# Patient Record
Sex: MALE | Race: BLACK OR AFRICAN AMERICAN | NOT HISPANIC OR LATINO | Employment: FULL TIME | URBAN - METROPOLITAN AREA
[De-identification: names, ages, dates, MRNs, and addresses within clinical notes are randomized per-mention and may not be internally consistent; named-entity substitution may affect disease eponyms.]

---

## 2024-01-17 ENCOUNTER — APPOINTMENT (EMERGENCY)
Dept: CT IMAGING | Facility: HOSPITAL | Age: 54
End: 2024-01-17
Payer: COMMERCIAL

## 2024-01-17 ENCOUNTER — APPOINTMENT (EMERGENCY)
Dept: RADIOLOGY | Facility: HOSPITAL | Age: 54
End: 2024-01-17
Payer: COMMERCIAL

## 2024-01-17 ENCOUNTER — APPOINTMENT (OUTPATIENT)
Dept: MRI IMAGING | Facility: HOSPITAL | Age: 54
End: 2024-01-17
Payer: COMMERCIAL

## 2024-01-17 ENCOUNTER — HOSPITAL ENCOUNTER (OUTPATIENT)
Facility: HOSPITAL | Age: 54
Setting detail: OBSERVATION
Discharge: HOME | End: 2024-01-18
Attending: EMERGENCY MEDICINE | Admitting: INTERNAL MEDICINE
Payer: COMMERCIAL

## 2024-01-17 ENCOUNTER — APPOINTMENT (OUTPATIENT)
Dept: NON INVASIVE DIAGNOSTICS | Facility: HOSPITAL | Age: 54
End: 2024-01-17
Payer: COMMERCIAL

## 2024-01-17 DIAGNOSIS — R29.90 STROKE-LIKE SYMPTOMS: ICD-10-CM

## 2024-01-17 DIAGNOSIS — R07.9 CHEST PAIN: ICD-10-CM

## 2024-01-17 DIAGNOSIS — R20.2 PARESTHESIAS: Primary | ICD-10-CM

## 2024-01-17 PROBLEM — I10 PRIMARY HYPERTENSION: Status: ACTIVE | Noted: 2024-01-17

## 2024-01-17 LAB
2HR DELTA HS TROPONIN: 2 NG/L
4HR DELTA HS TROPONIN: 1 NG/L
ALBUMIN SERPL BCP-MCNC: 4.3 G/DL (ref 3.5–5)
ALP SERPL-CCNC: 59 U/L (ref 34–104)
ALT SERPL W P-5'-P-CCNC: 39 U/L (ref 7–52)
ANION GAP SERPL CALCULATED.3IONS-SCNC: 9 MMOL/L
AORTIC ROOT: 2.7 CM
APICAL FOUR CHAMBER EJECTION FRACTION: 64 %
ASCENDING AORTA: 3 CM
AST SERPL W P-5'-P-CCNC: 28 U/L (ref 13–39)
ATRIAL RATE: 70 BPM
ATRIAL RATE: 86 BPM
BASOPHILS # BLD AUTO: 0.02 THOUSANDS/ÂΜL (ref 0–0.1)
BASOPHILS NFR BLD AUTO: 1 % (ref 0–1)
BILIRUB SERPL-MCNC: 0.45 MG/DL (ref 0.2–1)
BSA FOR ECHO PROCEDURE: 2.14 M2
BUN SERPL-MCNC: 10 MG/DL (ref 5–25)
CALCIUM SERPL-MCNC: 9.4 MG/DL (ref 8.4–10.2)
CARDIAC TROPONIN I PNL SERPL HS: 4 NG/L
CARDIAC TROPONIN I PNL SERPL HS: 5 NG/L
CARDIAC TROPONIN I PNL SERPL HS: 6 NG/L
CHLORIDE SERPL-SCNC: 106 MMOL/L (ref 96–108)
CHOLEST SERPL-MCNC: 134 MG/DL
CO2 SERPL-SCNC: 22 MMOL/L (ref 21–32)
CREAT SERPL-MCNC: 0.96 MG/DL (ref 0.6–1.3)
E WAVE DECELERATION TIME: 274 MS
E/A RATIO: 1.11
EOSINOPHIL # BLD AUTO: 0.15 THOUSAND/ÂΜL (ref 0–0.61)
EOSINOPHIL NFR BLD AUTO: 4 % (ref 0–6)
ERYTHROCYTE [DISTWIDTH] IN BLOOD BY AUTOMATED COUNT: 11.9 % (ref 11.6–15.1)
FRACTIONAL SHORTENING: 54 (ref 28–44)
GFR SERPL CREATININE-BSD FRML MDRD: 89 ML/MIN/1.73SQ M
GLUCOSE SERPL-MCNC: 93 MG/DL (ref 65–140)
GLUCOSE SERPL-MCNC: 94 MG/DL (ref 65–140)
HCT VFR BLD AUTO: 46.3 % (ref 36.5–49.3)
HDLC SERPL-MCNC: 39 MG/DL
HGB BLD-MCNC: 14.9 G/DL (ref 12–17)
IMM GRANULOCYTES # BLD AUTO: 0.02 THOUSAND/UL (ref 0–0.2)
IMM GRANULOCYTES NFR BLD AUTO: 1 % (ref 0–2)
INTERVENTRICULAR SEPTUM IN DIASTOLE (PARASTERNAL SHORT AXIS VIEW): 1 CM
INTERVENTRICULAR SEPTUM: 1 CM (ref 0.6–1.1)
LAAS-AP2: 14.9 CM2
LAAS-AP4: 17.5 CM2
LDLC SERPL CALC-MCNC: 81 MG/DL (ref 0–100)
LEFT ATRIUM AREA SYSTOLE SINGLE PLANE A4C: 15.9 CM2
LEFT ATRIUM SIZE: 3.5 CM
LEFT ATRIUM VOLUME (MOD BIPLANE): 46 ML
LEFT INTERNAL DIMENSION IN SYSTOLE: 2.4 CM (ref 2.1–4)
LEFT VENTRICULAR INTERNAL DIMENSION IN DIASTOLE: 5.2 CM (ref 3.5–6)
LEFT VENTRICULAR POSTERIOR WALL IN END DIASTOLE: 1 CM
LEFT VENTRICULAR STROKE VOLUME: 113 ML
LVSV (TEICH): 113 ML
LYMPHOCYTES # BLD AUTO: 1.56 THOUSANDS/ÂΜL (ref 0.6–4.47)
LYMPHOCYTES NFR BLD AUTO: 37 % (ref 14–44)
MCH RBC QN AUTO: 30.7 PG (ref 26.8–34.3)
MCHC RBC AUTO-ENTMCNC: 32.2 G/DL (ref 31.4–37.4)
MCV RBC AUTO: 95 FL (ref 82–98)
MONOCYTES # BLD AUTO: 0.74 THOUSAND/ÂΜL (ref 0.17–1.22)
MONOCYTES NFR BLD AUTO: 18 % (ref 4–12)
MV E'TISSUE VEL-SEP: 9 CM/S
MV PEAK A VEL: 0.56 M/S
MV PEAK E VEL: 62 CM/S
MV STENOSIS PRESSURE HALF TIME: 79 MS
MV VALVE AREA P 1/2 METHOD: 2.78
NEUTROPHILS # BLD AUTO: 1.73 THOUSANDS/ÂΜL (ref 1.85–7.62)
NEUTS SEG NFR BLD AUTO: 39 % (ref 43–75)
NRBC BLD AUTO-RTO: 0 /100 WBCS
P AXIS: 57 DEGREES
P AXIS: 62 DEGREES
PLATELET # BLD AUTO: 209 THOUSANDS/UL (ref 149–390)
PMV BLD AUTO: 10 FL (ref 8.9–12.7)
POTASSIUM SERPL-SCNC: 3.7 MMOL/L (ref 3.5–5.3)
PR INTERVAL: 162 MS
PR INTERVAL: 176 MS
PROT SERPL-MCNC: 8.7 G/DL (ref 6.4–8.4)
QRS AXIS: 11 DEGREES
QRS AXIS: 4 DEGREES
QRSD INTERVAL: 84 MS
QRSD INTERVAL: 86 MS
QT INTERVAL: 348 MS
QT INTERVAL: 360 MS
QTC INTERVAL: 388 MS
QTC INTERVAL: 416 MS
RBC # BLD AUTO: 4.86 MILLION/UL (ref 3.88–5.62)
RIGHT ATRIUM AREA SYSTOLE A4C: 11.4 CM2
RIGHT VENTRICLE ID DIMENSION: 3.2 CM
SL CV LEFT ATRIUM LENGTH A2C: 4.4 CM
SL CV LV EF: 60
SL CV PED ECHO LEFT VENTRICLE DIASTOLIC VOLUME (MOD BIPLANE) 2D: 132 ML
SL CV PED ECHO LEFT VENTRICLE SYSTOLIC VOLUME (MOD BIPLANE) 2D: 19 ML
SODIUM SERPL-SCNC: 137 MMOL/L (ref 135–147)
T WAVE AXIS: 28 DEGREES
T WAVE AXIS: 42 DEGREES
TRICUSPID ANNULAR PLANE SYSTOLIC EXCURSION: 2.6 CM
TRIGL SERPL-MCNC: 72 MG/DL
TSH SERPL DL<=0.05 MIU/L-ACNC: 2.71 UIU/ML (ref 0.45–4.5)
VENTRICULAR RATE: 70 BPM
VENTRICULAR RATE: 86 BPM
WBC # BLD AUTO: 4.22 THOUSAND/UL (ref 4.31–10.16)

## 2024-01-17 PROCEDURE — 84484 ASSAY OF TROPONIN QUANT: CPT

## 2024-01-17 PROCEDURE — 70496 CT ANGIOGRAPHY HEAD: CPT

## 2024-01-17 PROCEDURE — 99285 EMERGENCY DEPT VISIT HI MDM: CPT

## 2024-01-17 PROCEDURE — 71046 X-RAY EXAM CHEST 2 VIEWS: CPT

## 2024-01-17 PROCEDURE — 36415 COLL VENOUS BLD VENIPUNCTURE: CPT

## 2024-01-17 PROCEDURE — 99283 EMERGENCY DEPT VISIT LOW MDM: CPT | Performed by: STUDENT IN AN ORGANIZED HEALTH CARE EDUCATION/TRAINING PROGRAM

## 2024-01-17 PROCEDURE — 99222 1ST HOSP IP/OBS MODERATE 55: CPT | Performed by: STUDENT IN AN ORGANIZED HEALTH CARE EDUCATION/TRAINING PROGRAM

## 2024-01-17 PROCEDURE — 84443 ASSAY THYROID STIM HORMONE: CPT

## 2024-01-17 PROCEDURE — 93306 TTE W/DOPPLER COMPLETE: CPT

## 2024-01-17 PROCEDURE — 70498 CT ANGIOGRAPHY NECK: CPT

## 2024-01-17 PROCEDURE — 80061 LIPID PANEL: CPT

## 2024-01-17 PROCEDURE — 70551 MRI BRAIN STEM W/O DYE: CPT

## 2024-01-17 PROCEDURE — 93005 ELECTROCARDIOGRAM TRACING: CPT

## 2024-01-17 PROCEDURE — 82948 REAGENT STRIP/BLOOD GLUCOSE: CPT

## 2024-01-17 PROCEDURE — 93306 TTE W/DOPPLER COMPLETE: CPT | Performed by: INTERNAL MEDICINE

## 2024-01-17 PROCEDURE — 80053 COMPREHEN METABOLIC PANEL: CPT

## 2024-01-17 PROCEDURE — 85025 COMPLETE CBC W/AUTO DIFF WBC: CPT

## 2024-01-17 RX ORDER — DOCUSATE SODIUM 100 MG/1
100 CAPSULE, LIQUID FILLED ORAL 2 TIMES DAILY PRN
Status: DISCONTINUED | OUTPATIENT
Start: 2024-01-17 | End: 2024-01-18 | Stop reason: HOSPADM

## 2024-01-17 RX ORDER — CALCIUM CARBONATE 500 MG/1
1000 TABLET, CHEWABLE ORAL DAILY PRN
Status: DISCONTINUED | OUTPATIENT
Start: 2024-01-17 | End: 2024-01-18 | Stop reason: HOSPADM

## 2024-01-17 RX ORDER — ASPIRIN 81 MG/1
81 TABLET, CHEWABLE ORAL DAILY
Status: DISCONTINUED | OUTPATIENT
Start: 2024-01-17 | End: 2024-01-18

## 2024-01-17 RX ORDER — DOCUSATE SODIUM 100 MG/1
100 CAPSULE, LIQUID FILLED ORAL 2 TIMES DAILY
Status: DISCONTINUED | OUTPATIENT
Start: 2024-01-17 | End: 2024-01-17

## 2024-01-17 RX ORDER — SODIUM CHLORIDE 9 MG/ML
3 INJECTION INTRAVENOUS
Status: DISCONTINUED | OUTPATIENT
Start: 2024-01-17 | End: 2024-01-18 | Stop reason: HOSPADM

## 2024-01-17 RX ORDER — ACETAMINOPHEN 325 MG/1
650 TABLET ORAL EVERY 4 HOURS PRN
Status: DISCONTINUED | OUTPATIENT
Start: 2024-01-17 | End: 2024-01-18 | Stop reason: HOSPADM

## 2024-01-17 RX ORDER — ATORVASTATIN CALCIUM 20 MG/1
20 TABLET, FILM COATED ORAL EVERY EVENING
Status: DISCONTINUED | OUTPATIENT
Start: 2024-01-17 | End: 2024-01-18 | Stop reason: HOSPADM

## 2024-01-17 RX ORDER — ONDANSETRON 2 MG/ML
4 INJECTION INTRAMUSCULAR; INTRAVENOUS EVERY 6 HOURS PRN
Status: DISCONTINUED | OUTPATIENT
Start: 2024-01-17 | End: 2024-01-18 | Stop reason: HOSPADM

## 2024-01-17 RX ORDER — AMLODIPINE BESYLATE 10 MG/1
10 TABLET ORAL DAILY
COMMUNITY
Start: 2023-12-11

## 2024-01-17 RX ORDER — MAGNESIUM SULFATE HEPTAHYDRATE 40 MG/ML
2 INJECTION, SOLUTION INTRAVENOUS ONCE
Status: COMPLETED | OUTPATIENT
Start: 2024-01-17 | End: 2024-01-17

## 2024-01-17 RX ORDER — AMLODIPINE BESYLATE 10 MG/1
10 TABLET ORAL DAILY
Status: DISCONTINUED | OUTPATIENT
Start: 2024-01-17 | End: 2024-01-18 | Stop reason: HOSPADM

## 2024-01-17 RX ADMIN — ATORVASTATIN CALCIUM 20 MG: 20 TABLET, FILM COATED ORAL at 18:55

## 2024-01-17 RX ADMIN — ASPIRIN 81 MG: 81 TABLET, CHEWABLE ORAL at 09:18

## 2024-01-17 RX ADMIN — AMLODIPINE BESYLATE 10 MG: 10 TABLET ORAL at 09:18

## 2024-01-17 RX ADMIN — MAGNESIUM SULFATE HEPTAHYDRATE 2 G: 40 INJECTION, SOLUTION INTRAVENOUS at 10:42

## 2024-01-17 RX ADMIN — IOHEXOL 85 ML: 350 INJECTION, SOLUTION INTRAVENOUS at 03:29
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
## 2024-01-17 NOTE — PLAN OF CARE
Problem: PAIN - ADULT  Goal: Verbalizes/displays adequate comfort level or baseline comfort level  Description: Interventions:  - Encourage patient to monitor pain and request assistance  - Assess pain using appropriate pain scale  - Administer analgesics based on type and severity of pain and evaluate response  - Implement non-pharmacological measures as appropriate and evaluate response  - Consider cultural and social influences on pain and pain management  - Notify physician/advanced practitioner if interventions unsuccessful or patient reports new pain  Outcome: Progressing     Problem: INFECTION - ADULT  Goal: Absence or prevention of progression during hospitalization  Description: INTERVENTIONS:  - Assess and monitor for signs and symptoms of infection  - Monitor lab/diagnostic results  - Monitor all insertion sites, i.e. indwelling lines, tubes, and drains  - Monitor endotracheal if appropriate and nasal secretions for changes in amount and color  - [place] appropriate cooling/warming therapies per order  - Administer medications as ordered  - Instruct and encourage patient and family to use good hand hygiene technique  - Identify and instruct in appropriate isolation precautions for identified infection/condition  Outcome: Progressing     Problem: SAFETY ADULT  Goal: Patient will remain free of falls  Description: INTERVENTIONS:  - Educate patient/family on patient safety including physical limitations  - Instruct patient to call for assistance with activity   - Consult OT/PT to assist with strengthening/mobility   - Keep Call bell within reach  - Keep bed low and locked with side rails adjusted as appropriate  - Keep care items and personal belongings within reach  - Initiate and maintain comfort rounds  - Make Fall Risk Sign visible to staff  - Offer Toileting every 2 Hours, in advance of need  - Initiate/Maintain bed alarm  - Obtain necessary fall risk management equipment  - Apply yellow socks and  bracelet for high fall risk patients  - Consider moving patient to room near nurses station  Outcome: Progressing  Goal: Maintain or return to baseline ADL function  Description: INTERVENTIONS:  -  Assess patient's ability to carry out ADLs; assess patient's baseline for ADL function and identify physical deficits which impact ability to perform ADLs (bathing, care of mouth/teeth, toileting, grooming, dressing, etc.)  - Assess/evaluate cause of self-care deficits   - Assess range of motion  - Assess patient's mobility; develop plan if impaired  - Assess patient's need for assistive devices and provide as appropriate  - Encourage maximum independence but intervene and supervise when necessary  - Involve family in performance of ADLs  - Assess for home care needs following discharge   - Consider OT consult to assist with ADL evaluation and planning for discharge  - Provide patient education as appropriate  Outcome: Progressing  Goal: Maintains/Returns to pre admission functional level  Description: INTERVENTIONS:  - Perform AM-PAC 6 Click Basic Mobility/ Daily Activity assessment daily.  - Set and communicate daily mobility goal to care team and patient/family/caregiver.   - Collaborate with rehabilitation services on mobility goals if consulted  - Perform Range of Motion 3 times a day.  - Reposition patient every 2 hours.  - Dangle patient 3 times a day  - Stand patient 3 times a day  - Ambulate patient 3 times a day  - Out of bed to chair 3 times a day   - Out of bed for meals 3 times a day  - Out of bed for toileting  - Record patient progress and toleration of activity level   Outcome: Progressing     Problem: DISCHARGE PLANNING  Goal: Discharge to home or other facility with appropriate resources  Description: INTERVENTIONS:  - Identify barriers to discharge w/patient and caregiver  - Arrange for needed discharge resources and transportation as appropriate  - Identify discharge learning needs (meds, wound care,  etc.)  - Arrange for interpretive services to assist at discharge as needed  - Refer to Case Management Department for coordinating discharge planning if the patient needs post-hospital services based on physician/advanced practitioner order or complex needs related to functional status, cognitive ability, or social support system  Outcome: Progressing     Problem: Knowledge Deficit  Goal: Patient/family/caregiver demonstrates understanding of disease process, treatment plan, medications, and discharge instructions  Description: Complete learning assessment and assess knowledge base.  Interventions:  - Provide teaching at level of understanding  - Provide teaching via preferred learning methods  Outcome: Progressing     Problem: NEUROSENSORY - ADULT  Goal: Achieves stable or improved neurological status  Description: INTERVENTIONS  - Monitor and report changes in neurological status  - Monitor vital signs such as temperature, blood pressure, glucose, and any other labs ordered   - Initiate measures to prevent increased intracranial pressure  - Monitor for seizure activity and implement precautions if appropriate      Outcome: Progressing  Goal: Remains free of injury related to seizures activity  Description: INTERVENTIONS  - Maintain airway, patient safety  and administer oxygen as ordered  - Monitor patient for seizure activity, document and report duration and description of seizure to physician/advanced practitioner  - If seizure occurs,  ensure patient safety during seizure  - Reorient patient post seizure  - Seizure pads on all 4 side rails  - Instruct patient/family to notify RN of any seizure activity including if an aura is experienced  - Instruct patient/family to call for assistance with activity based on nursing assessment  - Administer anti-seizure medications if ordered    Outcome: Progressing  Goal: Achieves maximal functionality and self care  Description: INTERVENTIONS  - Monitor swallowing and  airway patency with patient fatigue and changes in neurological status  - Encourage and assist patient to increase activity and self care.   - Encourage visually impaired, hearing impaired and aphasic patients to use assistive/communication devices  Outcome: Progressing     Problem: Neurological Deficit  Goal: Neurological status is stable or improving  Description: Interventions:  - Monitor and assess patient's level of consciousness, motor function, sensory function, and level of assistance needed for ADLs.   - Monitor and report changes from baseline. Collaborate with interdisciplinary team to initiate plan and implement interventions as ordered.   - Provide and maintain a safe environment.  - Consider seizure precautions.  - Consider fall precautions.  - Consider aspiration precautions.  - Consider bleeding precautions.  Outcome: Progressing     Problem: Activity Intolerance/Impaired Mobility  Goal: Mobility/activity is maintained at optimum level for patient  Description: Interventions:  - Assess and monitor patient  barriers to mobility and need for assistive/adaptive devices.  - Assess patient's emotional response to limitations.  - Collaborate with interdisciplinary team and initiate plans and interventions as ordered.  - Encourage independent activity per ability.  - Maintain proper body alignment.  - Perform active/passive rom as tolerated/ordered.  - Plan activities to conserve energy.  - Turn patient as appropriate  Outcome: Progressing     Problem: Communication Impairment  Goal: Ability to express needs and understand communication  Description: Assess patient's communication skills and ability to understand information.  Patient will demonstrate use of effective communication techniques, alternative methods of communication and understanding even if not able to speak.     - Encourage communication and provide alternate methods of communication as needed.  - Collaborate with case management/social  services for discharge needs.  - Include patient/family/caregiver in decisions related to communication.  Outcome: Progressing     Problem: Potential for Aspiration  Goal: Non-ventilated patient's risk of aspiration is minimized  Description: Assess and monitor vital signs, respiratory status, and labs (WBC).  Monitor for signs of aspiration (tachypnea, cough, rales, wheezing, cyanosis, fever).    - Assess and monitor patient's ability to swallow.  - Place patient up in chair to eat if possible.  - HOB up at 90 degrees to eat if unable to get patient up into chair.  - Supervise patient during oral intake.   - Instruct patient/ family to take small bites.  - Instruct patient/ family to take small single sips when taking liquids.  - Follow patient-specific strategies generated by speech pathologist.  Outcome: Progressing  Goal: Ventilated patient's risk of aspiration is minimized  Description: Assess and monitor vital signs, respiratory status, airway cuff pressure, and labs (WBC).  Monitor for signs of aspiration (tachypnea, cough, rales, wheezing, cyanosis, fever).    - Elevate head of bed 30 degrees if patient has tube feeding.  - Monitor tube feeding.  Outcome: Progressing     Problem: Nutrition  Goal: Nutrition/Hydration status is improving  Description: Monitor and assess patient's nutrition/hydration status for malnutrition (ex- brittle hair, bruises, dry skin, pale skin and conjunctiva, muscle wasting, smooth red tongue, and disorientation). Collaborate with interdisciplinary team and initiate plan and interventions as ordered.  Monitor patient's weight and dietary intake as ordered or per policy. Utilize nutrition screening tool and intervene per policy. Determine patient's food preferences and provide high-protein, high-caloric foods as appropriate.     - Assist patient with eating.  - Allow adequate time for meals.  - Encourage patient to take dietary supplement as ordered.  - Collaborate with clinical  nutritionist.  - Include patient/family/caregiver in decisions related to nutrition.  Outcome: Progressing

## 2024-01-17 NOTE — ASSESSMENT & PLAN NOTE
52 y/o male with HTN, who presented with complaints of R UE/LE heaviness since 1/12 with symptoms progressively worsening since onset along with chest pain, R paresthesias, and R eye twitching. BP on presentation 146/92.    Work up:  - CTA head and neck:  No evidence of acute intracranial hemorrhage. No definite CT evidence of acute intracranial abnormality. Clinical correlation is recommended. If further evaluation is indicated, MRI with diffusion-weighted imaging could be performed, if there are no contraindications.  No evidence of large vessel occlusion.  There is moderate stenosis of the proximal left cavernous ICA.  Aberrant right subclavian artery.  - MRI brain:  1. No acute infarction, intracranial hemorrhage or mass effect.  2. Partially empty sella turcica and ectasia of the intraorbital optic nerves, nonspecific findings which can be associated with intracranial hypertension. Further clinical assessment advised.  - Labs: LDL 81, TSH 2.710    Work up unremarkable for acute stroke. Per discussion with attending neurologist- DDX may include: cervical (lower suspicion) vs migraine vs functional.    Plan:  - Discontinue stroke pathway  Echo pending official read  Hemoglobin A1c pending  Goal normotension; avoid hypotension  Continue telemetry  PT/OT/ST  Frequent neuro checks. Continue to monitor and notify neurology with any changes.   - Give magnesium sulfate 2 g IV x 1  - Medical management and supportive care per primary team. Correction of any metabolic or infectious disturbances.

## 2024-01-17 NOTE — ED PROVIDER NOTES
"History  Chief Complaint   Patient presents with    Numbness     Patient reports right sided facial numbness and \"heaviness\" going down their right side, arm and leg. Patient reports \"my left side feels so light and this right side feels so heavy\". Patient reports symptoms started 4 days ago, denies dizziness or pain, reports intermittent right eye \"pulsing\" but denies vision issues.      The patient is a 53 y.o. male with a history of HTN who presents to [facility] with a chief complaint of right sided heaviness of the right arm and right leg. Symptoms began Friday 1/12/24 and have been worsening since onset. His pain is currently rated as a 3/10 in severity and described as chest pain without radiation. Associated symptoms include chest pain, paresthesias. Symptoms are aggravated with none noted and alleviating factors include none noted. The patient denies fever, chills, night sweats, cough, wheezing, nausea, vomiting, cardiac history, leg pain or swelling, confusion, slurred speech, facial asymmetry. No other reported symptoms at this time.  Patient denies allergies to anything  Patient reports this has never happened before          History provided by:  Patient   used: No        Prior to Admission Medications   Prescriptions Last Dose Informant Patient Reported? Taking?   amLODIPine (NORVASC) 10 mg tablet   Yes Yes   Sig: Take 10 mg by mouth daily      Facility-Administered Medications: None       No past medical history on file.    No past surgical history on file.    No family history on file.  I have reviewed and agree with the history as documented.    E-Cigarette/Vaping     E-Cigarette/Vaping Substances     Social History     Tobacco Use    Smoking status: Never    Smokeless tobacco: Never   Substance Use Topics    Alcohol use: Never    Drug use: Never       Review of Systems   Constitutional:  Negative for chills and fever.   HENT:  Negative for ear pain and sore " throat.    Eyes:  Negative for pain and visual disturbance.   Respiratory:  Positive for chest tightness. Negative for cough, choking, shortness of breath and wheezing.    Cardiovascular:  Positive for chest pain. Negative for palpitations and leg swelling.   Gastrointestinal:  Negative for abdominal distention, abdominal pain, constipation, diarrhea, nausea, rectal pain and vomiting.   Genitourinary:  Negative for dysuria and hematuria.   Musculoskeletal:  Negative for arthralgias and back pain.   Skin:  Negative for color change and rash.   Neurological:  Negative for dizziness, seizures, syncope, facial asymmetry, speech difficulty, light-headedness and headaches.        Paresthesias   All other systems reviewed and are negative.      Physical Exam  Physical Exam  Vitals reviewed.   Constitutional:       General: He is not in acute distress.     Appearance: Normal appearance. He is not ill-appearing.   HENT:      Head: Normocephalic and atraumatic.      Right Ear: Tympanic membrane normal.      Left Ear: Tympanic membrane normal.      Nose: Nose normal.      Mouth/Throat:      Mouth: Mucous membranes are moist.   Eyes:      Extraocular Movements: Extraocular movements intact.      Conjunctiva/sclera: Conjunctivae normal.      Pupils: Pupils are equal, round, and reactive to light.   Cardiovascular:      Rate and Rhythm: Normal rate.      Pulses: Normal pulses.   Pulmonary:      Effort: Pulmonary effort is normal. No respiratory distress.      Breath sounds: Normal breath sounds. No stridor. No wheezing or rhonchi.   Abdominal:      General: Abdomen is flat.   Musculoskeletal:         General: Normal range of motion.      Cervical back: Normal range of motion. No rigidity or tenderness.   Skin:     General: Skin is warm and dry.      Capillary Refill: Capillary refill takes less than 2 seconds.      Coloration: Skin is not jaundiced or pale.      Findings: No bruising.   Neurological:      General: No focal  deficit present.      Mental Status: He is alert and oriented to person, place, and time.      GCS: GCS eye subscore is 4. GCS verbal subscore is 5. GCS motor subscore is 6.      Cranial Nerves: Cranial nerves 2-12 are intact. No cranial nerve deficit.      Sensory: Sensation is intact. No sensory deficit.      Motor: Motor function is intact. No weakness, tremor, abnormal muscle tone, seizure activity or pronator drift.      Coordination: Coordination is intact.      Gait: Gait is intact. Gait normal.         Vital Signs  ED Triage Vitals   Temperature Pulse Respirations Blood Pressure SpO2   01/17/24 0200 01/17/24 0200 01/17/24 0200 01/17/24 0200 01/17/24 0200   97.7 °F (36.5 °C) 86 19 146/92 98 %      Temp Source Heart Rate Source Patient Position - Orthostatic VS BP Location FiO2 (%)   01/17/24 0200 01/17/24 0200 01/17/24 0300 01/17/24 0300 --   Temporal Monitor Lying Right arm       Pain Score       --                  Vitals:    01/17/24 0530 01/17/24 0600 01/17/24 0630 01/17/24 0715   BP: 115/78 132/83 119/82 139/94   Pulse: 72 71 74 75   Patient Position - Orthostatic VS: Lying Lying Lying Lying         Visual Acuity  Visual Acuity      Flowsheet Row Most Recent Value   L Pupil Size (mm) 3   R Pupil Size (mm) 3            ED Medications  Medications   sodium chloride (PF) 0.9 % injection 3 mL (has no administration in time range)   amLODIPine (NORVASC) tablet 10 mg (has no administration in time range)   acetaminophen (TYLENOL) tablet 650 mg (has no administration in time range)   ondansetron (ZOFRAN) injection 4 mg (has no administration in time range)   calcium carbonate (TUMS) chewable tablet 1,000 mg (has no administration in time range)   atorvastatin (LIPITOR) tablet 20 mg (has no administration in time range)   aspirin chewable tablet 81 mg (has no administration in time range)   docusate sodium (COLACE) capsule 100 mg (has no administration in time range)   iohexol (OMNIPAQUE) 350 MG/ML injection  (MULTI-DOSE) 85 mL (85 mL Intravenous Given 1/17/24 0329)       Diagnostic Studies  Results Reviewed       Procedure Component Value Units Date/Time    HS Troponin I 4hr [[id number]] Collected: 01/17/24 0751    Lab Status: In process Specimen: Blood from Arm, Right Updated: 01/17/24 0755    Lipid Panel with Direct LDL reflex [[id number]] Collected: 01/17/24 0751    Lab Status: In process Specimen: Blood from Arm, Right Updated: 01/17/24 0755    TSH, 3rd generation with Free T4 reflex [[id number]]  (Normal) Collected: 01/17/24 0247    Lab Status: Final result Specimen: Blood from Arm, Right Updated: 01/17/24 0726     TSH 3RD GENERATON 2.710 uIU/mL     HS Troponin I 2hr [[id number]]  (Normal) Collected: 01/17/24 0456    Lab Status: Final result Specimen: Blood from Arm, Right Updated: 01/17/24 0532     hs TnI 2hr 6 ng/L      Delta 2hr hsTnI 2 ng/L     HS Troponin 0hr (reflex protocol) [[id number]]  (Normal) Collected: 01/17/24 0247    Lab Status: Final result Specimen: Blood from Arm, Right Updated: 01/17/24 0316     hs TnI 0hr 4 ng/L     Comprehensive metabolic panel [[id number]]  (Abnormal) Collected: 01/17/24 0247    Lab Status: Final result Specimen: Blood from Arm, Right Updated: 01/17/24 0311     Sodium 137 mmol/L      Potassium 3.7 mmol/L      Chloride 106 mmol/L      CO2 22 mmol/L      ANION GAP 9 mmol/L      BUN 10 mg/dL      Creatinine 0.96 mg/dL      Glucose 94 mg/dL      Calcium 9.4 mg/dL      AST 28 U/L      ALT 39 U/L      Alkaline Phosphatase 59 U/L      Total Protein 8.7 g/dL      Albumin 4.3 g/dL      Total Bilirubin 0.45 mg/dL      eGFR 89 ml/min/1.73sq m     Narrative:      [employer] guidelines for Chronic Kidney Disease (CKD):     Stage 1 with normal or high GFR (GFR > 90 mL/min/1.73 square meters)    Stage 2 Mild CKD (GFR = 60-89 mL/min/1.73 square meters)    Stage 3A Moderate CKD (GFR = 45-59 mL/min/1.73 square meters)    Stage 3B Moderate CKD (GFR = 30-44 mL/min/1.73 square  meters)    Stage 4 Severe CKD (GFR = 15-29 mL/min/1.73 square meters)    Stage 5 End Stage CKD (GFR <15 mL/min/1.73 square meters)  Note: GFR calculation is accurate only with a steady state creatinine    CBC and differential [[id number]]  (Abnormal) Collected: 01/17/24 0247    Lab Status: Final result Specimen: Blood from Arm, Right Updated: 01/17/24 0253     WBC 4.22 Thousand/uL      RBC 4.86 Million/uL      Hemoglobin 14.9 g/dL      Hematocrit 46.3 %      MCV 95 fL      MCH 30.7 pg      MCHC 32.2 g/dL      RDW 11.9 %      MPV 10.0 fL      Platelets 209 Thousands/uL      nRBC 0 /100 WBCs      Neutrophils Relative 39 %      Immat GRANS % 1 %      Lymphocytes Relative 37 %      Monocytes Relative 18 %      Eosinophils Relative 4 %      Basophils Relative 1 %      Neutrophils Absolute 1.73 Thousands/µL      Immature Grans Absolute 0.02 Thousand/uL      Lymphocytes Absolute 1.56 Thousands/µL      Monocytes Absolute 0.74 Thousand/µL      Eosinophils Absolute 0.15 Thousand/µL      Basophils Absolute 0.02 Thousands/µL     Fingerstick Glucose (POCT) [[id number]]  (Normal) Collected: 01/17/24 0208    Lab Status: Final result Updated: 01/17/24 0208     POC Glucose 93 mg/dl                    MRI brain wo contrast   Final Result by Feliberto Elizabeth MD (01/17 6851)         1. No acute infarction, intracranial hemorrhage or mass effect.   2. Partially empty sella turcica and ectasia of the intraorbital optic nerves, nonspecific findings which can be associated with intracranial hypertension. Further clinical assessment advised. Does the patient have papilledema?      Workstation performed: [id number]         CTA head and neck with and without contrast   Final Result by Guerline Goldstein MD (01/17 5627)      No evidence of acute intracranial hemorrhage. No definite CT evidence of acute intracranial abnormality. Clinical correlation is recommended. If further evaluation is indicated, MRI with diffusion-weighted imaging  could be performed, if there are no    contraindications.      No evidence of large vessel occlusion.      There is moderate stenosis of the proximal left cavernous ICA.      Aberrant right subclavian artery.      Small thyroid nodules. Please see discussion/guidelines.            Workstation performed: [id number]         X-ray chest 2 views    (Results Pending)              Procedures  Procedures         ED Course  ED Course as of 01/17/24 0807   Wed Jan 17, 2024   0317 hs TnI 0hr: 4   0519 CTA head and neck with and without contrast     No evidence of acute intracranial hemorrhage. No definite CT evidence of acute intracranial abnormality. Clinical correlation is recommended. If further evaluation is indicated, MRI with diffusion-weighted imaging could be performed, if there are no   contraindications.     No evidence of large vessel occlusion.     There is moderate stenosis of the proximal left cavernous ICA.     Aberrant right subclavian artery.     Small thyroid nodules   0536 Delta 2hr hsTnI: 2                   Medical Decision Making  The patient is a 53 y.o. male with a history of HTN who presents to [facility] with a chief complaint of right sided heaviness of the right arm and right leg.  Patient is having symptoms of paresthesia to the right side of the body.  As well as some chest pain.  Will obtain a cardiac workup as well as a CTA.  Patient's symptoms are started on Friday 1/12/24 he is out of the window for tPA as well as code stroke.  Patient has no focal deficits on exam.  Patient's blood work unremarkable.  CTA showed No evidence of acute intracranial hemorrhage. No definite CT evidence of acute intracranial abnormality. Clinical correlation is recommended. If further evaluation is indicated, MRI with diffusion-weighted imaging could be performed, if there are no contraindications. No evidence of large vessel occlusion. There is moderate stenosis of the proximal left cavernous ICA.  Aberrant right subclavian artery. Small thyroid nodules.  Discussed results with the patient.  Patient understands.  Given persistent symptoms I recommend he stays in the hospital to be evaluated by neurology and for an MRI.  Patient understands agrees with treatment plan.  Discussed with qiana will admit obs under Dr. Moreira with neuroconsult for continued evaluation and treatment.     Problems Addressed:  Chest pain: acute illness or injury  Paresthesias: acute illness or injury    Amount and/or Complexity of Data Reviewed  Labs: ordered. Decision-making details documented in ED Course.  Radiology: ordered. Decision-making details documented in ED Course.    Risk  Prescription drug management.  Decision regarding hospitalization.             Disposition  Final diagnoses:   Paresthesias   Chest pain     Time reflects when diagnosis was documented in both MDM as applicable and the Disposition within this note       Time User Action Codes Description Comment    1/17/2024  5:52 AM Brian Segura Add [R20.2] Paresthesias     1/17/2024  5:52 AM Brian Segura [R07.9] Chest pain           ED Disposition       ED Disposition   Admit    Condition   Stable    Date/Time   Wed Jan 17, 2024  5:52 AM    Comment   Case was discussed with QIANA and the patient's admission status was agreed to be Admission Status: observation status to the service of Dr. Moreira .               Follow-up Information    None         Patient's Medications   Discharge Prescriptions    No medications on file       No discharge procedures on file.    PDMP Review       None            ED Provider  Electronically Signed by             Brian Segura PA-C  01/17/24 0814

## 2024-01-17 NOTE — CONSULTS
Consultation - Neurology   Bhavin Veloz 53 y.o. male MRN: [id number]  Unit/Bed#: ED 13 Encounter: [id number]      Assessment/Plan     * Stroke-like symptoms  Assessment & Plan  54 y/o male with HTN, who presented with complaints of R UE/LE heaviness since 1/12 with symptoms progressively worsening since onset along with chest pain, R paresthesias, and R eye twitching. BP on presentation 146/92.    Work up:  - CTA head and neck:  No evidence of acute intracranial hemorrhage. No definite CT evidence of acute intracranial abnormality. Clinical correlation is recommended. If further evaluation is indicated, MRI with diffusion-weighted imaging could be performed, if there are no contraindications.  No evidence of large vessel occlusion.  There is moderate stenosis of the proximal left cavernous ICA.  Aberrant right subclavian artery.  - MRI brain:  1. No acute infarction, intracranial hemorrhage or mass effect.  2. Partially empty sella turcica and ectasia of the intraorbital optic nerves, nonspecific findings which can be associated with intracranial hypertension. Further clinical assessment advised.  - Labs: LDL 81, TSH 2.710    Work up unremarkable for acute stroke. Per discussion with attending neurologist- DDX may include: cervical (lower suspicion) vs migraine vs functional.    Plan:  - Discontinue stroke pathway  Echo pending official read  Hemoglobin A1c pending  Goal normotension; avoid hypotension  Continue telemetry  PT/OT/ST  Frequent neuro checks. Continue to monitor and notify neurology with any changes.   - Give magnesium sulfate 2 g IV x 1  - Medical management and supportive care per primary team. Correction of any metabolic or infectious disturbances.         Bhavin Veloz will need follow up in in 6 weeks with general attending or advance practitioner. He will not require outpatient neurological testing.    Case and treatment plan reviewed with attending neurologist, Dr. Berkowitz. Please see  "attending attestation for any further recommendations.      History of Present Illness     Reason for Consult / Principal Problem: R sided numbness/heaviness  HPI: Bhavin Veloz is a 53 y.o.  male with HTN, who presents with R sided numbness/heaviness.    Patient presented with complaints of R UE/LE heaviness since 1/12 with symptoms progressively worsening since onset. Patient also reported chest pain, R sided paresthesias, and R eye twitching. BP on presentation 146/92. Patient did report on admission that he had similar symptoms approximately 2-3 years ago and was found to be in hypertensive urgency at that time.    Patient seen and evaluated with attending neurologist. Patient reports he got into an argument with his wife on Friday. He then proceeded to have onset of R UE/LE heaviness. He thought he was tired and needed rest. He got rest Saturday and didn't do anything. He reports symptoms were worse Saturday because he also had \"eye jumping\", described as feeling a pulsating sensation in his R eye. He denies any eye pain or vision changes. He went to work Sunday and Monday and still felt the same, although the eye jumping went away Monday. He stayed home Tuesday and got some rest. He reported that once he calmed himself down and he got rest, his symptoms improved. He never had L sided symptoms. He did note that on Sunday, he felt the R corner of his mouth was down more than L. He reports his co-workers made a joke that he cut his mustache shorter on the R side. This resolved on Monday. He denies any speech changes, trouble swallowing, headache, neck pain, or back pain. He did have some chest pressure that was intermittent. He feels like he is carrying extra weight on the R side so he was to walk slower. He reports he had similar symptoms a few years ago when he became aggravated and his SBP was in the 180s. His symptoms lasted several days before resolving on its own at that time. He has not had a recurrence " until now. He denies any past medical history besides HTN. He reports he used to have migraines that felt like a ball thrown at his head located in the bifrontal region with photosensitivity and wavy vision but no sound sensitivity, nausea, or vomiting. It usually lasted 10-20 minutes if he went to a dark room and calmed himself down. He also noted the migraines improved when he got glasses. Currently, he still feels R side heaviness.    Inpatient consult to Neurology  Consult performed by: DA Dominguez  Consult ordered by: DA Betts          Review of Systems  A 12 system ROS was completed. Other than the above mentioned complaints in the HPI and those commented on below, all remaining systems were negative.    Historical Information   No past medical history on file.  No past surgical history on file.  Social History   Social History     Substance and Sexual Activity   Alcohol Use Never     Social History     Substance and Sexual Activity   Drug Use Never     E-Cigarette/Vaping     E-Cigarette/Vaping Substances     Social History     Tobacco Use   Smoking Status Never   Smokeless Tobacco Never     Family History: No family history on file.    Review of previous medical records was completed.    Meds/Allergies   all current active meds have been reviewed, current meds:   Current Facility-Administered Medications   Medication Dose Route Frequency    acetaminophen (TYLENOL) tablet 650 mg  650 mg Oral Q4H PRN    amLODIPine (NORVASC) tablet 10 mg  10 mg Oral Daily    aspirin chewable tablet 81 mg  81 mg Oral Daily    atorvastatin (LIPITOR) tablet 20 mg  20 mg Oral QPM    calcium carbonate (TUMS) chewable tablet 1,000 mg  1,000 mg Oral Daily PRN    docusate sodium (COLACE) capsule 100 mg  100 mg Oral BID PRN    ondansetron (ZOFRAN) injection 4 mg  4 mg Intravenous Q6H PRN    sodium chloride (PF) 0.9 % injection 3 mL  3 mL Intravenous Q1H PRN   , and PTA meds:   Prior to Admission Medications  "  Prescriptions Last Dose Informant Patient Reported? Taking?   amLODIPine (NORVASC) 10 mg tablet   Yes Yes   Sig: Take 10 mg by mouth daily      Facility-Administered Medications: None       No Known Allergies    Objective   Vitals:Blood pressure 119/82, pulse 74, temperature 97.7 °F (36.5 °C), temperature source Temporal, resp. rate 17, height 5' 10\" (1.778 m), weight 96.2 kg (212 lb), SpO2 96%.,Body mass index is 30.42 kg/m².    Intake/Output Summary (Last 24 hours) at 1/17/2024 1302  Last data filed at 1/17/2024 1247  Gross per 24 hour   Intake 50 ml   Output --   Net 50 ml       Invasive Devices:   Invasive Devices       Peripheral Intravenous Line  Duration             Peripheral IV 01/17/24 Proximal;Right;Ventral (anterior) Forearm <1 day                    Physical and neurologic exam performed by attending neurologist:  Physical Exam  Vitals and nursing note reviewed.   Constitutional:       General: He is not in acute distress.     Appearance: Normal appearance. He is not ill-appearing.   HENT:      Head: Normocephalic.      Mouth/Throat:      Mouth: Mucous membranes are moist.      Pharynx: Oropharynx is clear.   Eyes:      General: No scleral icterus.        Right eye: No discharge.         Left eye: No discharge.      Extraocular Movements: EOM normal.      Conjunctiva/sclera: Conjunctivae normal.   Cardiovascular:      Rate and Rhythm: Normal rate.   Pulmonary:      Effort: Pulmonary effort is normal. No respiratory distress.   Musculoskeletal:         General: Normal range of motion.   Skin:     General: Skin is warm and dry.      Coloration: Skin is not jaundiced or pale.   Neurological:      Mental Status: He is alert and oriented to person, place, and time.      Coordination: Finger-Nose-Finger Test and Heel to Shin Test normal.      Deep Tendon Reflexes:      Reflex Scores:       Tricep reflexes are 2+ on the right side and 2+ on the left side.       Bicep reflexes are 2+ on the right side and 2+ " on the left side.       Brachioradialis reflexes are 2+ on the right side and 2+ on the left side.       Patellar reflexes are 3+ (Cross adductor present) on the right side and 3+ (Cross adductor present) on the left side.       Achilles reflexes are 2+ on the right side and 2+ on the left side.  Psychiatric:         Mood and Affect: Mood normal.       Neurologic Exam     Mental Status   Oriented to person, place, and time.   Level of consciousness: alert  Able to follow simple, crossover, and multi-step commands appropriately. Able to name objects and repeat/read phrases correctly. No dysarthria noted.     Cranial Nerves     CN II   Right visual field deficit: none  Left visual field deficit: none     CN III, IV, VI   Extraocular motions are normal.     CN VII   Facial expression full, symmetric.     CN IX, X   Palate: symmetric    CN XI   CN XI normal.     CN XII   CN XII normal.   Symmetric sensation to light touch in face  Decreased sensation to temperature in R cheek and chin  Decreased sensation to pinprick in L forehead, R cheek, R chin     Motor Exam   Muscle bulk: normal  Right arm pronator drift: absent  Left arm pronator drift: absent  Bilateral UE strength 5/5 deltoids, biceps, triceps, hand   R wrist flexion, wrist extension, finger flexion, finger extension 5/5  Bilateral LE strength 5/5 hip flexion, knee flexion, knee extension, dorsiflexion, plantar flexion     Sensory Exam   RUE/LE sensation to light touch feels like extra weight on top of it  Decreased sensation to pinprick/vibration in RUE/LE  No extinction noted     Gait, Coordination, and Reflexes     Coordination   Finger to nose coordination: normal  Heel to shin coordination: normal    Tremor   Resting tremor: absent  Intention tremor: absent    Reflexes   Right brachioradialis: 2+  Left brachioradialis: 2+  Right biceps: 2+  Left biceps: 2+  Right triceps: 2+  Left triceps: 2+  Right patellar: 3+ (Cross adductor present)  Left patellar:  "3+ (Cross adductor present)  Right achilles: 2+  Left achilles: 2+  Right plantar: normal  Left plantar: normal  Right Levy: present  Left Levy: present  Right ankle clonus: absent  Left ankle clonus: absent      Lab Results: I have personally reviewed pertinent reports.  , CBC:   Results from last 7 days   Lab Units 01/17/24  0247   WBC Thousand/uL 4.22*   RBC Million/uL 4.86   HEMOGLOBIN g/dL 14.9   HEMATOCRIT % 46.3   MCV fL 95   PLATELETS Thousands/uL 209   , BMP/CMP:   Results from last 7 days   Lab Units 01/17/24  0247   SODIUM mmol/L 137   POTASSIUM mmol/L 3.7   CHLORIDE mmol/L 106   CO2 mmol/L 22   BUN mg/dL 10   CREATININE mg/dL 0.96   CALCIUM mg/dL 9.4   AST U/L 28   ALT U/L 39   ALK PHOS U/L 59   EGFR ml/min/1.73sq m 89   , Vitamin B12:   , HgBA1C:   , TSH:   Results from last 7 days   Lab Units 01/17/24  0247   TSH 3RD GENERATON uIU/mL 2.710   , Coagulation:   , Lipid Profile:   Results from last 7 days   Lab Units 01/17/24  0751   HDL mg/dL 39*   LDL CALC mg/dL 81   TRIGLYCERIDES mg/dL 72   , Ammonia:   , Urinalysis:       Invalid input(s): \"URIBILINOGEN\", Drug Screen:   , Medication Drug Levels:       Invalid input(s): \"CARBAMAZEPINE\", \"LACOSAMIDE\", \"OXCARBAZEPINE\"    Imaging Studies: I have personally reviewed pertinent reports.   and I have personally reviewed pertinent films in PACS  EKG, Pathology, and Other Studies: I have personally reviewed pertinent reports.    VTE Prophylaxis: Sequential compression device (Venodyne)     Code Status: Level 1 - Full Code  Advance Directive and Living Will:      Power of :    POLST:    "

## 2024-01-17 NOTE — H&P
[facility]  H&P  Name: Bhavin Veloz 53 y.o. male I MRN: [id number]  Unit/Bed#: ED 13 I Date of Admission: 1/17/2024   Date of Service: 1/17/2024 I Hospital Day: 0      Assessment/Plan   * Stroke-like symptoms  Assessment & Plan  Background  Patient reports after an argument with spouse on Friday, he experienced right sided numbness, tingling in upper and lower extremities with right sided heaviness on his chest. He felt his symptoms would be resolve with rest but they persisted. Around 1am today (1/17/24), the sensation of heaviness, and tingling became worse. He states he had a similar episode about 2-3 years ago and was found to be in an hypertensive emergency. Denies facial droop, SOB, fever, headache    CT scan: No evidence of acute intracranial hemorrhage. No definite CT evidence of acute intracranial abnormality. Clinical correlation is recommended. If further evaluation is indicated, MRI with diffusion-weighted imaging could be performed, if there are no contraindications. No evidence of large vessel occlusion.There is moderate stenosis of the proximal left cavernous ICA. Aberrant right subclavian artery.Small thyroid nodules. Please see discussion/guidelines.  Stroke pathway  MRI pending  Echo pending  Monitor on tele  Low risk for DVT  Start daily ASA 81mg  Start statin  Lipid panel pending  Neurology consult; recommendations appreciated    Primary hypertension  Assessment & Plan  Bp acceptable on admission  Continue home dose amlodipine  Monitor            VTE Pharmacologic Prophylaxis: VTE Score: 2 Moderate Risk (Score 3-4) - Pharmacological DVT Prophylaxis Ordered: enoxaparin (Lovenox).  Code Status: Level 1 - Full Code   Discussion with family: Updated  (daughter) at bedside.    Anticipated Length of Stay: Patient will be admitted on an observation basis with an anticipated length of stay of less than 2 midnights secondary to rule out stroke.    Total Time  "Spent on Date of Encounter in care of patient: 75 mins. This time was spent on one or more of the following: performing physical exam; counseling and coordination of care; obtaining or reviewing history; documenting in the medical record; reviewing/ordering tests, medications or procedures; communicating with other healthcare professionals and discussing with patient's family/caregivers.    Chief Complaint:    Chief Complaint   Patient presents with    Numbness     Patient reports right sided facial numbness and \"heaviness\" going down their right side, arm and leg. Patient reports \"my left side feels so light and this right side feels so heavy\". Patient reports symptoms started 4 days ago, denies dizziness or pain, reports intermittent right eye \"pulsing\" but denies vision issues.          History of Present Illness:  Bhavin Veloz is a 53 y.o. male with a PMH of hypertension who presents with right sided weakness. Per patient on Friday after an argument with spouse, he experienced right sided weakness on his upper and lower extremity with heaviness on the right side of his chest. He thought the symptoms would resolve with rest, but they persisted. At 1am this morning his symptoms escalated with an addition of eye twitching. He denies facial droop, dizziness, headache, slurred speech or any other related symptoms. On arrival to ED, CT reveals no acute intracranial hemorrhage. MRI pending. Will admit for Neuro evaluation.      Review of Systems:  Review of Systems   Constitutional: Negative.    HENT: Negative.     Respiratory:  Positive for chest tightness. Negative for shortness of breath.    Cardiovascular:  Negative for chest pain.   Gastrointestinal: Negative.    Musculoskeletal: Negative.    Neurological:  Positive for weakness and numbness.   Psychiatric/Behavioral: Negative.         Past Medical and Surgical History:   No past medical history on file.    No past surgical history on " file.    Meds/Allergies:  Prior to Admission medications    Medication Sig Start Date End Date Taking? Authorizing Provider   amLODIPine (NORVASC) 10 mg tablet Take 10 mg by mouth daily 12/11/23  Yes Historical Provider, MD     I have reviewed home medications with patient personally.    Allergies: No Known Allergies    Social History:  Marital Status: /Civil Union   Occupation:   Patient Pre-hospital Living Situation: Home, With spouse  Patient Pre-hospital Level of Mobility: walks  Patient Pre-hospital Diet Restrictions:   Substance Use History:   Social History     Substance and Sexual Activity   Alcohol Use Never     Social History     Tobacco Use   Smoking Status Never   Smokeless Tobacco Never     Social History     Substance and Sexual Activity   Drug Use Never       Family History:  No family history on file.    Physical Exam:     Vitals:   Blood Pressure: 119/82 (01/17/24 0630)  Pulse: 74 (01/17/24 0630)  Temperature: 97.7 °F (36.5 °C) (01/17/24 0200)  Temp Source: Temporal (01/17/24 0200)  Respirations: 19 (01/17/24 0630)  Weight - Scale: 96.6 kg (212 lb 15.4 oz) (01/17/24 0200)  SpO2: 98 % (01/17/24 0630)    Physical Exam  Constitutional:       Appearance: He is obese.   HENT:      Head: Normocephalic.      Mouth/Throat:      Mouth: Mucous membranes are moist.   Cardiovascular:      Rate and Rhythm: Normal rate and regular rhythm.      Pulses: Normal pulses.      Heart sounds: Normal heart sounds.   Pulmonary:      Effort: Pulmonary effort is normal. No respiratory distress.      Breath sounds: Normal breath sounds.   Abdominal:      Palpations: Abdomen is soft.   Musculoskeletal:         General: Normal range of motion.   Skin:     General: Skin is warm.      Capillary Refill: Capillary refill takes less than 2 seconds.   Neurological:      General: No focal deficit present.      Mental Status: He is alert.      Motor: Weakness present.      Comments: Right sided weakness, decreased  strength    Psychiatric:         Mood and Affect: Mood normal.         Behavior: Behavior normal.         Thought Content: Thought content normal.          Additional Data:     Lab Results:  Results from last 7 days   Lab Units 01/17/24  0247   WBC Thousand/uL 4.22*   HEMOGLOBIN g/dL 14.9   HEMATOCRIT % 46.3   PLATELETS Thousands/uL 209   NEUTROS PCT % 39*   LYMPHS PCT % 37   MONOS PCT % 18*   EOS PCT % 4     Results from last 7 days   Lab Units 01/17/24  0247   SODIUM mmol/L 137   POTASSIUM mmol/L 3.7   CHLORIDE mmol/L 106   CO2 mmol/L 22   BUN mg/dL 10   CREATININE mg/dL 0.96   ANION GAP mmol/L 9   CALCIUM mg/dL 9.4   ALBUMIN g/dL 4.3   TOTAL BILIRUBIN mg/dL 0.45   ALK PHOS U/L 59   ALT U/L 39   AST U/L 28   GLUCOSE RANDOM mg/dL 94         Results from last 7 days   Lab Units 01/17/24  0208   POC GLUCOSE mg/dl 93               Lines/Drains:  Invasive Devices       Peripheral Intravenous Line  Duration             Peripheral IV 01/17/24 Proximal;Right;Ventral (anterior) Forearm <1 day                        Imaging: Reviewed radiology reports from this admission including: CT head  CTA head and neck with and without contrast   Final Result by Guerline Goldstein MD (01/17 0507)      No evidence of acute intracranial hemorrhage. No definite CT evidence of acute intracranial abnormality. Clinical correlation is recommended. If further evaluation is indicated, MRI with diffusion-weighted imaging could be performed, if there are no    contraindications.      No evidence of large vessel occlusion.      There is moderate stenosis of the proximal left cavernous ICA.      Aberrant right subclavian artery.      Small thyroid nodules. Please see discussion/guidelines.            Workstation performed: [id number]         X-ray chest 2 views    (Results Pending)   MRI brain wo contrast    (Results Pending)       EKG and Other Studies Reviewed on Admission:   EKG: NSR. HR 70.    ** Please Note: This note has been constructed using a  voice recognition system. **

## 2024-01-18 VITALS
TEMPERATURE: 99 F | RESPIRATION RATE: 18 BRPM | BODY MASS INDEX: 30.35 KG/M2 | DIASTOLIC BLOOD PRESSURE: 81 MMHG | OXYGEN SATURATION: 96 % | WEIGHT: 212 LBS | HEART RATE: 88 BPM | SYSTOLIC BLOOD PRESSURE: 123 MMHG | HEIGHT: 70 IN

## 2024-01-18 LAB
ANION GAP SERPL CALCULATED.3IONS-SCNC: 7 MMOL/L
BUN SERPL-MCNC: 11 MG/DL (ref 5–25)
CALCIUM SERPL-MCNC: 9.2 MG/DL (ref 8.4–10.2)
CHLORIDE SERPL-SCNC: 106 MMOL/L (ref 96–108)
CHOLEST SERPL-MCNC: 135 MG/DL
CO2 SERPL-SCNC: 25 MMOL/L (ref 21–32)
CREAT SERPL-MCNC: 0.95 MG/DL (ref 0.6–1.3)
EST. AVERAGE GLUCOSE BLD GHB EST-MCNC: 126 MG/DL
GFR SERPL CREATININE-BSD FRML MDRD: 91 ML/MIN/1.73SQ M
GLUCOSE P FAST SERPL-MCNC: 92 MG/DL (ref 65–99)
GLUCOSE SERPL-MCNC: 92 MG/DL (ref 65–140)
HBA1C MFR BLD: 6 %
HDLC SERPL-MCNC: 39 MG/DL
LDLC SERPL CALC-MCNC: 78 MG/DL (ref 0–100)
POTASSIUM SERPL-SCNC: 3.6 MMOL/L (ref 3.5–5.3)
SODIUM SERPL-SCNC: 138 MMOL/L (ref 135–147)
TRIGL SERPL-MCNC: 91 MG/DL

## 2024-01-18 PROCEDURE — 97161 PT EVAL LOW COMPLEX 20 MIN: CPT

## 2024-01-18 PROCEDURE — 97165 OT EVAL LOW COMPLEX 30 MIN: CPT

## 2024-01-18 PROCEDURE — 80061 LIPID PANEL: CPT

## 2024-01-18 PROCEDURE — 83036 HEMOGLOBIN GLYCOSYLATED A1C: CPT

## 2024-01-18 PROCEDURE — 99239 HOSP IP/OBS DSCHRG MGMT >30: CPT | Performed by: STUDENT IN AN ORGANIZED HEALTH CARE EDUCATION/TRAINING PROGRAM

## 2024-01-18 PROCEDURE — 80048 BASIC METABOLIC PNL TOTAL CA: CPT | Performed by: STUDENT IN AN ORGANIZED HEALTH CARE EDUCATION/TRAINING PROGRAM

## 2024-01-18 RX ADMIN — AMLODIPINE BESYLATE 10 MG: 10 TABLET ORAL at 10:16

## 2024-01-18 NOTE — PROGRESS NOTES
formerly [facility]  Progress Note  Name: Bhavin Veloz I  MRN: [id number]  Unit/Bed#: [facility] I Date of Admission: 1/17/2024   Date of Service: 1/18/2024  Hospital Day: 0    Assessment/Plan   Primary hypertension  Assessment & Plan  Bp acceptable on admission  Continue home dose amlodipine  Monitor     * Stroke-like symptoms  Assessment & Plan  Background  Patient reports after an argument with spouse on Friday, he experienced right sided numbness, tingling in upper and lower extremities with right sided heaviness on his chest. He felt his symptoms would be resolve with rest but they persisted. Around 1am today (1/17/24), the sensation of heaviness, and tingling became worse. He states he had a similar episode about 2-3 years ago and was found to be in an hypertensive emergency. Denies facial droop, SOB, fever, headache    CT scan: No evidence of acute intracranial hemorrhage. No definite CT evidence of acute intracranial abnormality. Clinical correlation is recommended. If further evaluation is indicated, MRI with diffusion-weighted imaging could be performed, if there are no contraindications. No evidence of large vessel occlusion.There is moderate stenosis of the proximal left cavernous ICA. Aberrant right subclavian artery.Small thyroid nodules. Please see discussion/guidelines.  MRI showed no evidence of stroke-neurology thinks is likely due to complex migraine.  Discontinue stroke pathway, aspirin, statin.  Patient given magnesium IV which seemed to help resolve symptoms  Feeling back to baseline prior to discharge  PT/OT recommended no further needs on discharge         Medical Problems       Resolved Problems  Date Reviewed: 1/17/2024   None       Discharging Physician / Practitioner: Devon Blackmon MD  PCP: Marcellus Hernandez  Admission Date:   Admission Orders (From admission, onward)       Ordered        01/17/24 0553  Place in Observation  Once                          Discharge  Date: 01/18/24    Consultations During Hospital Stay:  Neurology  PT/OT    Procedures Performed:   None    Significant Findings / Test Results:   MRI Brain: 1. No acute infarction, intracranial hemorrhage or mass effect.   2. Partially empty sella turcica and ectasia of the intraorbital optic nerves, nonspecific findings which can be associated with intracranial hypertension. Further clinical assessment advised. Does the patient have papilledema?   CT head/neck: No evidence of acute intracranial hemorrhage. No definite CT evidence of acute intracranial abnormality. Clinical correlation is recommended. If further evaluation is indicated, MRI with diffusion-weighted imaging could be performed, if there are no   contraindications.     No evidence of large vessel occlusion.     There is moderate stenosis of the proximal left cavernous ICA.     Aberrant right subclavian artery.     Small thyroid nodules. Please see discussion/guidelines.     Incidental Findings:   None     Test Results Pending at Discharge (will require follow up):   None     Outpatient Tests Requested:  None    Complications:  None    Reason for Admission: Strokelike symptoms    Hospital Course:   Bhavin Veloz is a 53 y.o. male patient who originally presented to the hospital on 1/17/2024 due to strokelike symptoms after an emotional argument.)  Present for several days prior to presentation.  He was admitted under the stroke pathway initially.  CT did not show any evidence of acute occlusion.  MRI confirmed that there is no evidence of ischemia.  MRI was read as anatomic abnormality which can sometimes be associated with intracranial hypertension.  Neurology did not have any concerns for this.  They felt the patient was likely symptomatic from a complex migraine.  He was given IV magnesium with resolution of his symptoms.  He will follow-up outpatient with neurology    Please see above list of diagnoses and related plan for additional information.  "    Condition at Discharge: good    Discharge Day Visit / Exam:   Subjective: Patient reports he feels completely back to baseline today.  Heaviness in his right arm and leg are resolved.  Vitals: Blood Pressure: 123/81 (01/18/24 1016)  Pulse: 88 (01/18/24 0718)  Temperature: 99 °F (37.2 °C) (01/18/24 0718)  Temp Source: Oral (01/18/24 0718)  Respirations: 18 (01/18/24 0718)  Height: 5' 10\" (177.8 cm) (01/17/24 1212)  Weight - Scale: 96.2 kg (212 lb) (01/17/24 1212)  SpO2: 96 % (01/18/24 0718)  Exam:   Physical Exam  Vitals and nursing note reviewed.   Constitutional:       General: He is not in acute distress.     Appearance: He is well-developed.   HENT:      Head: Normocephalic and atraumatic.   Eyes:      Conjunctiva/sclera: Conjunctivae normal.      Pupils: Pupils are equal, round, and reactive to light.   Cardiovascular:      Rate and Rhythm: Normal rate and regular rhythm.      Heart sounds: No murmur heard.  Pulmonary:      Effort: Pulmonary effort is normal. No respiratory distress.      Breath sounds: Normal breath sounds. No wheezing or rales.   Abdominal:      General: Abdomen is flat. Bowel sounds are normal. There is no distension.      Palpations: Abdomen is soft.      Tenderness: There is no abdominal tenderness. There is no guarding or rebound.   Musculoskeletal:         General: No swelling. Normal range of motion.      Cervical back: Normal range of motion.   Skin:     General: Skin is warm and dry.      Capillary Refill: Capillary refill takes less than 2 seconds.   Neurological:      General: No focal deficit present.      Mental Status: He is alert. Mental status is at baseline.   Psychiatric:         Mood and Affect: Mood normal.          Discussion with Family: Updated  (wife) at bedside.    Discharge instructions/Information to patient and family:   See after visit summary for information provided to patient and family.      Provisions for Follow-Up Care:  See after visit " summary for information related to follow-up care and any pertinent home health orders.      Mobility at time of Discharge:   Basic Mobility Inpatient Raw Score: 24  JH-HLM Goal: 8: Walk 250 feet or more  JH-HLM Achieved: 7: Walk 25 feet or more  HLM Goal NOT achieved. Continue to encourage mobility in post discharge setting.     Disposition:   Home    Planned Readmission: None     Discharge Statement:  I spent 40 minutes discharging the patient. This time was spent on the day of discharge. I had direct contact with the patient on the day of discharge. Greater than 50% of the total time was spent examining patient, answering all patient questions, arranging and discussing plan of care with patient as well as directly providing post-discharge instructions.  Additional time then spent on discharge activities.    Discharge Medications:  See after visit summary for reconciled discharge medications provided to patient and/or family.      **Please Note: This note may have been constructed using a voice recognition system**

## 2024-01-18 NOTE — DISCHARGE INSTR - AVS FIRST PAGE
Follow up with neurology in 6 weeks outpatient. Return to the ER if you have any new or worsening neurologic symptoms.

## 2024-01-18 NOTE — PLAN OF CARE
Problem: PAIN - ADULT  Goal: Verbalizes/displays adequate comfort level or baseline comfort level  Description: Interventions:  - Encourage patient to monitor pain and request assistance  - Assess pain using appropriate pain scale  - Administer analgesics based on type and severity of pain and evaluate response  - Implement non-pharmacological measures as appropriate and evaluate response  - Consider cultural and social influences on pain and pain management  - Notify physician/advanced practitioner if interventions unsuccessful or patient reports new pain  Outcome: Progressing     Problem: INFECTION - ADULT  Goal: Absence or prevention of progression during hospitalization  Description: INTERVENTIONS:  - Assess and monitor for signs and symptoms of infection  - Monitor lab/diagnostic results  - Monitor all insertion sites, i.e. indwelling lines, tubes, and drains  - Monitor endotracheal if appropriate and nasal secretions for changes in amount and color  - [place] appropriate cooling/warming therapies per order  - Administer medications as ordered  - Instruct and encourage patient and family to use good hand hygiene technique  - Identify and instruct in appropriate isolation precautions for identified infection/condition  Outcome: Progressing     Problem: SAFETY ADULT  Goal: Patient will remain free of falls  Description: INTERVENTIONS:  - Educate patient/family on patient safety including physical limitations  - Instruct patient to call for assistance with activity   - Consult OT/PT to assist with strengthening/mobility   - Keep Call bell within reach  - Keep bed low and locked with side rails adjusted as appropriate  - Keep care items and personal belongings within reach  - Initiate and maintain comfort rounds  - Make Fall Risk Sign visible to staff  - Offer Toileting every  Hours, in advance of need  - Initiate/Maintain alarm  - Obtain necessary fall risk management equipment:   - Apply yellow socks and  bracelet for high fall risk patients  - Consider moving patient to room near nurses station  Outcome: Progressing  Goal: Maintain or return to baseline ADL function  Description: INTERVENTIONS:  -  Assess patient's ability to carry out ADLs; assess patient's baseline for ADL function and identify physical deficits which impact ability to perform ADLs (bathing, care of mouth/teeth, toileting, grooming, dressing, etc.)  - Assess/evaluate cause of self-care deficits   - Assess range of motion  - Assess patient's mobility; develop plan if impaired  - Assess patient's need for assistive devices and provide as appropriate  - Encourage maximum independence but intervene and supervise when necessary  - Involve family in performance of ADLs  - Assess for home care needs following discharge   - Consider OT consult to assist with ADL evaluation and planning for discharge  - Provide patient education as appropriate  Outcome: Progressing  Goal: Maintains/Returns to pre admission functional level  Description: INTERVENTIONS:  - Perform AM-PAC 6 Click Basic Mobility/ Daily Activity assessment daily.  - Set and communicate daily mobility goal to care team and patient/family/caregiver.   - Collaborate with rehabilitation services on mobility goals if consulted  - Perform Range of Motion times a day.  - Reposition patient every  hours.  - Dangle patient  times a day  - Stand patient  times a day  - Ambulate patient times a day  - Out of bed to chair  times a day   - Out of bed for adriana times a day  - Out of bed for toileting  - Record patient progress and toleration of activity level   Outcome: Progressing     Problem: DISCHARGE PLANNING  Goal: Discharge to home or other facility with appropriate resources  Description: INTERVENTIONS:  - Identify barriers to discharge w/patient and caregiver  - Arrange for needed discharge resources and transportation as appropriate  - Identify discharge learning needs (meds, wound care, etc.)  -  Arrange for interpretive services to assist at discharge as needed  - Refer to Case Management Department for coordinating discharge planning if the patient needs post-hospital services based on physician/advanced practitioner order or complex needs related to functional status, cognitive ability, or social support system  Outcome: Progressing     Problem: Knowledge Deficit  Goal: Patient/family/caregiver demonstrates understanding of disease process, treatment plan, medications, and discharge instructions  Description: Complete learning assessment and assess knowledge base.  Interventions:  - Provide teaching at level of understanding  - Provide teaching via preferred learning methods  Outcome: Progressing     Problem: NEUROSENSORY - ADULT  Goal: Achieves stable or improved neurological status  Description: INTERVENTIONS  - Monitor and report changes in neurological status  - Monitor vital signs such as temperature, blood pressure, glucose, and any other labs ordered   - Initiate measures to prevent increased intracranial pressure  - Monitor for seizure activity and implement precautions if appropriate      Outcome: Progressing  Goal: Remains free of injury related to seizures activity  Description: INTERVENTIONS  - Maintain airway, patient safety  and administer oxygen as ordered  - Monitor patient for seizure activity, document and report duration and description of seizure to physician/advanced practitioner  - If seizure occurs,  ensure patient safety during seizure  - Reorient patient post seizure  - Seizure pads on all 4 side rails  - Instruct patient/family to notify RN of any seizure activity including if an aura is experienced  - Instruct patient/family to call for assistance with activity based on nursing assessment  - Administer anti-seizure medications if ordered    Outcome: Progressing  Goal: Achieves maximal functionality and self care  Description: INTERVENTIONS  - Monitor swallowing and airway  patency with patient fatigue and changes in neurological status  - Encourage and assist patient to increase activity and self care.   - Encourage visually impaired, hearing impaired and aphasic patients to use assistive/communication devices  Outcome: Progressing     Problem: Neurological Deficit  Goal: Neurological status is stable or improving  Description: Interventions:  - Monitor and assess patient's level of consciousness, motor function, sensory function, and level of assistance needed for ADLs.   - Monitor and report changes from baseline. Collaborate with interdisciplinary team to initiate plan and implement interventions as ordered.   - Provide and maintain a safe environment.  - Consider seizure precautions.  - Consider fall precautions.  - Consider aspiration precautions.  - Consider bleeding precautions.  Outcome: Progressing     Problem: Activity Intolerance/Impaired Mobility  Goal: Mobility/activity is maintained at optimum level for patient  Description: Interventions:  - Assess and monitor patient  barriers to mobility and need for assistive/adaptive devices.  - Assess patient's emotional response to limitations.  - Collaborate with interdisciplinary team and initiate plans and interventions as ordered.  - Encourage independent activity per ability.  - Maintain proper body alignment.  - Perform active/passive rom as tolerated/ordered.  - Plan activities to conserve energy.  - Turn patient as appropriate  Outcome: Progressing     Problem: Communication Impairment  Goal: Ability to express needs and understand communication  Description: Assess patient's communication skills and ability to understand information.  Patient will demonstrate use of effective communication techniques, alternative methods of communication and understanding even if not able to speak.     - Encourage communication and provide alternate methods of communication as needed.  - Collaborate with case management/  for discharge needs.  - Include patient/family/caregiver in decisions related to communication.  Outcome: Progressing     Problem: Potential for Aspiration  Goal: Non-ventilated patient's risk of aspiration is minimized  Description: Assess and monitor vital signs, respiratory status, and labs (WBC).  Monitor for signs of aspiration (tachypnea, cough, rales, wheezing, cyanosis, fever).    - Assess and monitor patient's ability to swallow.  - Place patient up in chair to eat if possible.  - HOB up at 90 degrees to eat if unable to get patient up into chair.  - Supervise patient during oral intake.   - Instruct patient/ family to take small bites.  - Instruct patient/ family to take small single sips when taking liquids.  - Follow patient-specific strategies generated by speech pathologist.  Outcome: Progressing  Goal: Ventilated patient's risk of aspiration is minimized  Description: Assess and monitor vital signs, respiratory status, airway cuff pressure, and labs (WBC).  Monitor for signs of aspiration (tachypnea, cough, rales, wheezing, cyanosis, fever).    - Elevate head of bed 30 degrees if patient has tube feeding.  - Monitor tube feeding.  Outcome: Progressing     Problem: Nutrition  Goal: Nutrition/Hydration status is improving  Description: Monitor and assess patient's nutrition/hydration status for malnutrition (ex- brittle hair, bruises, dry skin, pale skin and conjunctiva, muscle wasting, smooth red tongue, and disorientation). Collaborate with interdisciplinary team and initiate plan and interventions as ordered.  Monitor patient's weight and dietary intake as ordered or per policy. Utilize nutrition screening tool and intervene per policy. Determine patient's food preferences and provide high-protein, high-caloric foods as appropriate.     - Assist patient with eating.  - Allow adequate time for meals.  - Encourage patient to take dietary supplement as ordered.  - Collaborate with clinical  nutritionist.  - Include patient/family/caregiver in decisions related to nutrition.  Outcome: Progressing

## 2024-01-18 NOTE — DISCHARGE SUMMARY
[facility]  Discharge- Bhavin Veloz 1970, 53 y.o. male MRN: [id number]  Unit/Bed#: [facility] Encounter: [id number]  Primary Care Provider: Marcellus Hernandez   Date and time admitted to hospital: 1/17/2024  1:56 AM    Primary hypertension  Assessment & Plan  Bp acceptable on admission  Continue home dose amlodipine  Monitor     * Stroke-like symptoms  Assessment & Plan  Background  Patient reports after an argument with spouse on Friday, he experienced right sided numbness, tingling in upper and lower extremities with right sided heaviness on his chest. He felt his symptoms would be resolve with rest but they persisted. Around 1am today (1/17/24), the sensation of heaviness, and tingling became worse. He states he had a similar episode about 2-3 years ago and was found to be in an hypertensive emergency. Denies facial droop, SOB, fever, headache    CT scan: No evidence of acute intracranial hemorrhage. No definite CT evidence of acute intracranial abnormality. Clinical correlation is recommended. If further evaluation is indicated, MRI with diffusion-weighted imaging could be performed, if there are no contraindications. No evidence of large vessel occlusion.There is moderate stenosis of the proximal left cavernous ICA. Aberrant right subclavian artery.Small thyroid nodules. Please see discussion/guidelines.  MRI showed no evidence of stroke-neurology thinks is likely due to complex migraine.  Discontinue stroke pathway, aspirin, statin.  Patient given magnesium IV which seemed to help resolve symptoms  Feeling back to baseline prior to discharge  PT/OT recommended no further needs on discharge      Medical Problems       Resolved Problems  Date Reviewed: 1/17/2024   None       Discharging Physician / Practitioner: Devon Blackmon MD  PCP: Marcellus Hernandez  Admission Date:   Admission Orders (From admission, onward)       Ordered        01/17/24 0553  Place in Observation  Once                           Discharge Date: 01/18/24    Consultations During Hospital Stay:  Neurology  PT/OT    Procedures Performed:   None    Significant Findings / Test Results:   MRI Brain: 1. No acute infarction, intracranial hemorrhage or mass effect.   2. Partially empty sella turcica and ectasia of the intraorbital optic nerves, nonspecific findings which can be associated with intracranial hypertension. Further clinical assessment advised. Does the patient have papilledema?   CT head/neck: No evidence of acute intracranial hemorrhage. No definite CT evidence of acute intracranial abnormality. Clinical correlation is recommended. If further evaluation is indicated, MRI with diffusion-weighted imaging could be performed, if there are no   contraindications.     No evidence of large vessel occlusion.     There is moderate stenosis of the proximal left cavernous ICA.     Aberrant right subclavian artery.     Small thyroid nodules. Please see discussion/guidelines.     Incidental Findings:   None     Test Results Pending at Discharge (will require follow up):   None     Outpatient Tests Requested:  None    Complications:  None    Reason for Admission: Strokelike symptoms    Hospital Course:   Bhavin Veloz is a 53 y.o. male patient who originally presented to the hospital on 1/17/2024 due to strokelike symptoms after an emotional argument.)  Present for several days prior to presentation.  He was admitted under the stroke pathway initially.  CT did not show any evidence of acute occlusion.  MRI confirmed that there is no evidence of ischemia.  MRI was read as anatomic abnormality which can sometimes be associated with intracranial hypertension.  Neurology did not have any concerns for this.  They felt the patient was likely symptomatic from a complex migraine.  He was given IV magnesium with resolution of his symptoms.  He will follow-up outpatient with neurology     Please see above list of diagnoses and related plan  "for additional information.     Condition at Discharge: good    Discharge Day Visit / Exam:   Subjective:  Patient reports he feels completely back to baseline today.  Heaviness in his right arm and leg are resolved.   Vitals: Blood Pressure: 123/81 (01/18/24 1016)  Pulse: 88 (01/18/24 0718)  Temperature: 99 °F (37.2 °C) (01/18/24 0718)  Temp Source: Oral (01/18/24 0718)  Respirations: 18 (01/18/24 0718)  Height: 5' 10\" (177.8 cm) (01/17/24 1212)  Weight - Scale: 96.2 kg (212 lb) (01/17/24 1212)  SpO2: 96 % (01/18/24 0718)  Exam:   Physical Exam  Vitals and nursing note reviewed.   Constitutional:       General: He is not in acute distress.     Appearance: He is well-developed.   HENT:      Head: Normocephalic and atraumatic.   Eyes:      Conjunctiva/sclera: Conjunctivae normal.      Pupils: Pupils are equal, round, and reactive to light.   Cardiovascular:      Rate and Rhythm: Normal rate and regular rhythm.      Heart sounds: No murmur heard.  Pulmonary:      Effort: Pulmonary effort is normal. No respiratory distress.      Breath sounds: Normal breath sounds. No wheezing or rales.   Abdominal:      General: Abdomen is flat. Bowel sounds are normal. There is no distension.      Palpations: Abdomen is soft.      Tenderness: There is no abdominal tenderness. There is no guarding or rebound.   Musculoskeletal:         General: No swelling. Normal range of motion.      Cervical back: Normal range of motion.   Skin:     General: Skin is warm and dry.      Capillary Refill: Capillary refill takes less than 2 seconds.   Neurological:      General: No focal deficit present.      Mental Status: He is alert. Mental status is at baseline.   Psychiatric:         Mood and Affect: Mood normal.          Discussion with Family: Updated  (wife) at bedside.    Discharge instructions/Information to patient and family:   See after visit summary for information provided to patient and family.      Provisions for " Follow-Up Care:  See after visit summary for information related to follow-up care and any pertinent home health orders.      Mobility at time of Discharge:   Basic Mobility Inpatient Raw Score: 24  JH-HLM Goal: 8: Walk 250 feet or more  JH-HLM Achieved: 7: Walk 25 feet or more  HLM Goal NOT achieved. Continue to encourage mobility in post discharge setting.     Disposition:   Home    Planned Readmission: None     Discharge Statement:  I spent 40 minutes discharging the patient. This time was spent on the day of discharge. I had direct contact with the patient on the day of discharge. Greater than 50% of the total time was spent examining patient, answering all patient questions, arranging and discussing plan of care with patient as well as directly providing post-discharge instructions.  Additional time then spent on discharge activities.    Discharge Medications:  See after visit summary for reconciled discharge medications provided to patient and/or family.      **Please Note: This note may have been constructed using a voice recognition system**

## 2024-01-18 NOTE — PHYSICAL THERAPY NOTE
"   Physical Therapy Evaluation     Patient's Name: Bhavin Veloz    Admitting Diagnosis  Numbness [R20.0]  Chest pain [R07.9]  Paresthesias [R20.2]    Problem List  Patient Active Problem List   Diagnosis    Primary hypertension    Stroke-like symptoms     Past Medical History  No past medical history on file.    Past Surgical History  No past surgical history on file.     01/18/24 0936   PT Last Visit   PT Visit Date 01/18/24   Note Type   Note type Evaluation   Pain Assessment   Pain Assessment Tool 0-10   Pain Score No Pain   Restrictions/Precautions   Weight Bearing Precautions Per Order No   Other Precautions Telemetry;Fall Risk   Home Living   Type of Home House   Home Layout Two level;Bed/bath upstairs;Stairs to enter with rails  (2 AMALIA; flight of stairs to 2nd floor)   Bathroom Shower/Tub Walk-in shower   Bathroom Toilet Raised   Bathroom Equipment Built-in shower seat   Bathroom Accessibility Accessible   Home Equipment Other (Comment)  (none per patient)   Additional Comments Pt ambulates without an AD.   Prior Function   Level of [place] Independent with functional mobility;Independent with ADLs;Independent with IADLS   Lives With Family   Receives Help From Family   IADLs Independent with driving;Independent with medication management;Independent with meal prep   Falls in the last 6 months 0   Vocational Full time employment   General   Family/Caregiver Present No   Cognition   Overall Cognitive Status WFL   Arousal/Participation Alert   Orientation Level Oriented X4   Memory Within functional limits   Following Commands Follows all commands and directions without difficulty   Comments Pt agreeable to PT.   Subjective   Subjective \"I haven't been out of bed.\"   RLE Assessment   RLE Assessment WFL   LLE Assessment   LLE Assessment WFL   Light Touch   RLE Light Touch Grossly intact   LLE Light Touch Grossly intact   Bed Mobility   Supine to Sit 6  Modified independent   Additional items HOB " elevated   Transfers   Sit to Stand 7  Independent   Stand to Sit 7  Independent   Ambulation/Elevation   Gait pattern WNL   Gait Assistance 7  Independent   Assistive Device None   Distance 330 feet   Stair Management Assistance 6  Modified independent   Stair Management Technique One rail R;Alternating pattern;Foreward   Number of Stairs 12   Balance   Static Sitting Normal   Dynamic Sitting Good   Static Standing Good   Dynamic Standing Good   Ambulatory Good   Endurance Deficit   Endurance Deficit No   Activity Tolerance   Activity Tolerance Patient tolerated treatment well   Medical Staff Made Aware Dr. Blackmon; care coordination with OT   Nurse Made Aware DARNELL Kelly   Assessment   Prognosis Excellent   Problem List   (no acute PT impairments)   Assessment Pt is 53 year old male seen for PT evaluation s/p admit to [facility] on 1/17/2024 with Stroke-like symptoms. PT consulted to assess pt's functional mobility and discharge needs. Order placed for PT evaluation and treatment, with ambulate patient order. Comorbidities affecting pt's physical performance at time of assessment include primary hypertension. Prior to hospitalization, pt was independent with all functional mobility without an AD. Pt ambulates unrestricted distances on all terrain and elevations. Pt resides with his family in a two level house with two steps to enter. Personal factors affecting pt at time of initial evaluation include lives in at two story house and stairs to enter home. Please find objective findings from PT assessment regarding body systems outlined above. The following objective measures were performed on initial evaluation Barthel Index: 100/100, Modified [place]: 0 no symptoms at all, and AM-PAC 6-Clicks: 24/24. Pt's clinical presentation is currently stable seen in pt's presentation of need for ongoing medical management/monitoring. Pt is independent with transfers and functional mobility. From a PT standpoint,  recommendation at time of discharge would be home with no post acute rehabilitation needs. Pt is at his baseline functional status. No acute PT impairments identified. Plan to discontinue PT.   Barriers to Discharge None   Goals   Patient Goals to go home   Plan   PT Frequency Other (Comment)  (discontinue PT)   Discharge Recommendation   Rehab Resource Intensity Level, PT No post-acute rehabilitation needs   AM-PAC Basic Mobility Inpatient   Turning in Flat Bed Without Bedrails 4   Lying on Back to Sitting on Edge of Flat Bed Without Bedrails 4   Moving Bed to Chair 4   Standing Up From Chair Using Arms 4   Walk in Room 4   Climb 3-5 Stairs With Railing 4   Basic Mobility Inpatient Raw Score 24   Basic Mobility Standardized Score 57.68   Highest Level Of Mobility   JH-HLM Goal 8: Walk 250 feet or more   JH-HLM Achieved 8: Walk 250 feet ot more   Modified [place] Scale   Modified [place] Scale 0   Barthel Index   Feeding 10   Bathing 5   Grooming Score 5   Dressing Score 10   Bladder Score 10   Bowels Score 10   Toilet Use Score 10   Transfers (Bed/Chair) Score 15   Mobility (Level Surface) Score 15   Stairs Score 10   Barthel Index Score 100     PT Evaluation Time: 0925-0936  Carol Moreno, PT, DPT

## 2024-01-18 NOTE — CASE MANAGEMENT
Case Management Discharge Planning Note    Patient name Bhavin Veloz  Location /-01 MRN [id number]  : 1970 Date 2024       Current Admission Date: 2024  Current Admission Diagnosis:Stroke-like symptoms   Patient Active Problem List    Diagnosis Date Noted    Primary hypertension 2024    Stroke-like symptoms 2024      LOS (days): 0  Geometric Mean LOS (GMLOS) (days):   Days to GMLOS:     OBJECTIVE:     Current admission status: Observation   Preferred Pharmacy:   [id number] - [facility] [place] - [address]  [address]  [facility] [place] [address]  Phone: [phone] Fax: [phone]    Primary Care Provider: No primary care provider on file.  Primary Insurance: BLUE CROSS  Secondary Insurance:     DISCHARGE DETAILS:  Patient reviewed during Interdisciplinary Rounds with SLIM today.  Patient anticipated for d/c home today.  IPTA.  AMPA is 22.  Patient with no anticipated CM needs.  CM assessment pended.  CM will remain available through to d/c. (PCP is Dr. Hernandez - [facility])

## 2024-01-18 NOTE — ASSESSMENT & PLAN NOTE
Background  Patient reports after an argument with spouse on Friday, he experienced right sided numbness, tingling in upper and lower extremities with right sided heaviness on his chest. He felt his symptoms would be resolve with rest but they persisted. Around 1am today (1/17/24), the sensation of heaviness, and tingling became worse. He states he had a similar episode about 2-3 years ago and was found to be in an hypertensive emergency. Denies facial droop, SOB, fever, headache    CT scan: No evidence of acute intracranial hemorrhage. No definite CT evidence of acute intracranial abnormality. Clinical correlation is recommended. If further evaluation is indicated, MRI with diffusion-weighted imaging could be performed, if there are no contraindications. No evidence of large vessel occlusion.There is moderate stenosis of the proximal left cavernous ICA. Aberrant right subclavian artery.Small thyroid nodules. Please see discussion/guidelines.  MRI showed no evidence of stroke-neurology thinks is likely due to complex migraine.  Discontinue stroke pathway, aspirin, statin.  Patient given magnesium IV which seemed to help resolve symptoms  Feeling back to baseline prior to discharge  PT/OT recommended no further needs on discharge

## 2024-01-18 NOTE — OCCUPATIONAL THERAPY NOTE
Occupational Therapy Evaluation        Patient Name: Bhavin Veloz  Today's Date: 1/18/2024 01/18/24 0922   OT Last Visit   OT Visit Date 01/18/24   Note Type   Note type Evaluation   Pain Assessment   Pain Assessment Tool 0-10   Pain Score No Pain   Restrictions/Precautions   Weight Bearing Precautions Per Order No   Other Precautions Telemetry;Fall Risk   Home Living   Type of Home House   Home Layout Two level;Bed/bath upstairs;Performs ADLs on one level;Stairs to enter with rails;1/2 bath on main level  (2 AMALIA/ full flight to second floor)   Bathroom Shower/Tub Walk-in shower   Bathroom Toilet Raised   Bathroom Equipment Built-in shower seat   Bathroom Accessibility Accessible   Home Equipment Other (Comment)  (none reported)   Additional Comments ambulatory without AD at baseline   Prior Function   Level of [place] Independent with ADLs;Independent with functional mobility   Lives With Family   Receives Help From Family   IADLs Independent with driving;Independent with meal prep;Independent with medication management   Falls in the last 6 months 0   Vocational Full time employment   Lifestyle   Autonomy Patient was independent with ADLs/ IADLs, ambulatory without AD, works full time. Patient lives with family i  a2 story house, 2 AMALIA and full flight to second floor bedroom.   Reciprocal Relationships Supportive family   Service to Others Full time manger at [employer]   Intrinsic Gratification very active, plays guitar, fishing, son's wrestling team   General   Family/Caregiver Present No   ADL   Eating Assistance 7  Independent   Grooming Assistance 6  Modified Independent   UB Bathing Assistance 6  Modified Independent   LB Bathing Assistance 6  Modified Independent   UB Dressing Assistance 7  Independent   LB Dressing Assistance 6  Modified independent   Toileting Assistance  7  Independent   Functional Assistance 7  Independent   Bed Mobility   Supine to Sit 6  Modified independent    Additional items HOB elevated   Transfers   Sit to Stand 7  Independent   Stand to Sit 7  Independent   Functional Mobility   Functional Mobility 7  Independent   Balance   Static Sitting Normal   Dynamic Sitting Good   Static Standing Good   Dynamic Standing Good   Ambulatory Good   Activity Tolerance   Activity Tolerance Patient tolerated treatment well   Medical Staff Made Aware Dr. Blackmon; care coordination with OT   RUE Assessment   RUE Assessment WFL   LUE Assessment   LUE Assessment WFL   Hand Function   Gross Motor Coordination Functional   Fine Motor Coordination Functional   Sensation   Light Touch No apparent deficits   Proprioception   Proprioception No apparent deficits   Vision-Basic Assessment   Current Vision Does not wear glasses   Psychosocial   Psychosocial (WDL) WDL   Perception   Inattention/Neglect Appears intact   Cognition   Overall Cognitive Status WFL   Arousal/Participation Alert;Responsive;Cooperative   Attention Within functional limits   Orientation Level Oriented X4   Memory Within functional limits   Following Commands Follows all commands and directions without difficulty   Assessment   Assessment Patient is a 53 y.o. male seen for OT evaluation s/p admit to [facility] on 1/17/2024 w/Stroke-like symptoms. Commorbidities affecting patient's functional performance at time of assessment include:  primary hypertension, stroke like symptoms, patient presented to ED with right sided weakness on his upper and lower extremity with heaviness on the right side of his chest. Orders placed for OT evaluation and treatment.  Performed at least two patient identifiers during session including name and wristband. Prior to admission,  Patient was independent with ADLs/ IADLs, ambulatory without AD, works full time. Patient lives with family i a2 story house, 2 AMALIA and full flight to second floor bedroom.  Upon evaluation, patient requires independent assist for UB ADLs, modified  independent assist for LB ADLs, transfers and functional ambulation in room and bathroom with modified independent assist, without AD. Presents with functional use of BUEs, with intact prehension, coordination and symmetrical muscle strength.  Patient appears to be functioning at baseline. No further acute OT needs identified at this time to warrant continuation of services. D/C OT services. From OT standpoint, recommendation at time of d/c would be Home with family support.   Discharge Recommendation   Rehab Resource Intensity Level, OT No post-acute rehabilitation needs   AM-PAC Daily Activity Inpatient   Lower Body Dressing 4   Bathing 3   Toileting 4   Upper Body Dressing 4   Grooming 4   Eating 4   Daily Activity Raw Score 23   Daily Activity Standardized Score (Calc for Raw Score >=11) 51.12   AM-PAC Applied Cognition Inpatient   Following a Speech/Presentation 4   Understanding Ordinary Conversation 4   Taking Medications 4   Remembering Where Things Are Placed or Put Away 4   Remembering List of 4-5 Errands 4   Taking Care of Complicated Tasks 4   Applied Cognition Raw Score 24   Applied Cognition Standardized Score 62.21   Barthel Index   Feeding 10   Bathing 5   Grooming Score 5   Dressing Score 10   Bladder Score 10   Bowels Score 10   Toilet Use Score 10   Transfers (Bed/Chair) Score 15   Mobility (Level Surface) Score 15   Stairs Score 10   Barthel Index Score 100

## 2024-01-18 NOTE — UTILIZATION REVIEW
"Initial Clinical Review    Admission: Date/Time/Statement:   Admission Orders (From admission, onward)       Ordered        01/17/24 0553  Place in Observation  Once                          Orders Placed This Encounter   Procedures    Place in Observation     Standing Status:   Standing     Number of Occurrences:   1     Order Specific Question:   Level of Care     Answer:   Med Surg [16]     ED Arrival Information       Expected   -    Arrival   1/17/2024 01:54    Acuity   Emergent              Means of arrival   Walk-In    Escorted by   Family Member    Service   Hospitalist    Admission type   Emergency              Arrival complaint   Right sided sensation/heaviness             Chief Complaint   Patient presents with    Numbness     Patient reports right sided facial numbness and \"heaviness\" going down their right side, arm and leg. Patient reports \"my left side feels so light and this right side feels so heavy\". Patient reports symptoms started 4 days ago, denies dizziness or pain, reports intermittent right eye \"pulsing\" but denies vision issues.        Initial Presentation: 53 y.o. male to the ED from home with complaints of numbness to right side of face, tingling in upper and lower right sided extremities.  Admitted under observation for stroke like symptoms. H/O htn.  Arrives with GCS 15, no complaints, has had chest pain as well.  CT head shows no acute findings. Check MRI brain.  Neurology consult. In the Ed, given IV mag.   Neurology consult: MRi brain shows: Partially empty sella turcica and ectasia of the intraorbital optic nerves, nonspecific findings which can be associated with intracranial hypertension. Work up unremarkable for acute stroke.  Patellar reflexes are 3+ (Cross adductor present) on the right side and 3+ (Cross adductor present) on the left side.     Date:     Day 2:      ED Triage Vitals   Temperature Pulse Respirations Blood Pressure SpO2   01/17/24 0200 01/17/24 0200 01/17/24 0200 " 01/17/24 0200 01/17/24 0200   97.7 °F (36.5 °C) 86 19 146/92 98 %      Temp Source Heart Rate Source Patient Position - Orthostatic VS BP Location FiO2 (%)   01/17/24 0200 01/17/24 0200 01/17/24 0300 01/17/24 0300 --   Temporal Monitor Lying Right arm       Pain Score       01/17/24 1556       No Pain          Wt Readings from Last 1 Encounters:   01/17/24 96.2 kg (212 lb)     Additional Vital Signs: Vital Signs (last 2 days)    Date/Time Temp Pulse Resp BP MAP (mmHg) SpO2 O2 Device Patient Position - Orthostatic VS   01/18/24 07:18:24 99 °F (37.2 °C) 88 18 124/79 94 96 % -- --   01/18/24 0500 -- 78 18 124/79 94 94 % None (Room air) Lying   01/17/24 21:49:56 98.7 °F (37.1 °C) 81 -- 124/85 98 96 % -- --   01/17/24 2100 98.7 °F (37.1 °C) 75 18 116/77 90 96 % None (Room air) Lying   01/17/24 1900 98.3 °F (36.8 °C) 85 17 123/82 95 96 % None (Room air) Lying   01/17/24 18:01:30 -- 78 -- 125/82 96 96 % -- --   01/17/24 1800 -- -- -- -- -- -- None (Room air) Lying   01/17/24 1700 -- -- 16 131/88 -- -- None (Room air) Sitting   01/17/24 16:07:21 98.8 °F (37.1 °C) 77 16 132/84 100 96 % -- --   01/17/24 1600 98.8 °F (37.1 °C) 77 16 132/84 100 96 % None (Room air) Lying   01/17/24 1426 -- 71 20 125/87 97 97 % None (Room air) Lying   01/17/24 1400 -- 74 20 118/72 90 97 % None (Room air) Lying   01/17/24 1300 -- 72 16 120/77 92 97 % None (Room air) Lying   01/17/24 1212 -- 74 -- 119/82 -- -- -- --   01/17/24 1200 -- 73 17 120/73 90 96 % None (Room air) Lying   01/17/24 1136 -- 74 20 128/67 90 96 % None (Room air) Lying   01/17/24 1100 -- 75 22 129/83 99 95 % None (Room air) Lying   01/17/24 1000 -- 81 18 129/84 99 96 % None (Room air) Lying   01/17/24 0900 -- 76 17 122/80 -- 97 % None (Room air) Lying   01/17/24 0800 -- 88 19 136/89 -- 97 % None (Room air) Lying   01/17/24 0715 -- 75 21 139/94 112 97 % None (Room air) Lying   01/17/24 0630 -- 74 19 119/82 96 98 % None (Room air) Lying   01/17/24 0600 -- 71 16 132/83 101 97 %  None (Room air) Lying   01/17/24 0530 -- 72 18 115/78 91 96 % None (Room air) Lying   01/17/24 0500 -- 70 17 111/79 92 96 % None (Room air) Lying   01/17/24 0430 -- 68 20 131/87 103 96 % None (Room air) Lying   01/17/24 0300 -- 75 21 116/77 92 96 % None (Room air) Lying   01/17/24 0211 -- 80 20 136/90 108 97 % -- --   01/17/24 0200 97.7 °F (36.5 °C) 86 19 146/92 -- 98 % None (Room air) --     Pertinent Labs/Diagnostic Test Results:   1/17 ECHO;   Interpretation Summary         Left Ventricle: Left ventricular cavity size is normal. Wall thickness is normal. The left ventricular ejection fraction is 60%. Systolic function is normal. Wall motion is normal. Diastolic function is normal.  1/17 EKG: Narrative & Impression    Normal sinus rhythm  Nonspecific T wave abnormality        MRI brain wo contrast   Final Result by Feliberto Elizabeth MD (01/17 2808)         1. No acute infarction, intracranial hemorrhage or mass effect.   2. Partially empty sella turcica and ectasia of the intraorbital optic nerves, nonspecific findings which can be associated with intracranial hypertension. Further clinical assessment advised. Does the patient have papilledema?      Workstation performed: [id number]         X-ray chest 2 views   Final Result by Felipe Will MD (01/17 7391)      No acute cardiopulmonary disease.                  Workstation performed: [id number]         CTA head and neck with and without contrast   Final Result by Guerline Goldstein MD (01/17 3593)      No evidence of acute intracranial hemorrhage. No definite CT evidence of acute intracranial abnormality. Clinical correlation is recommended. If further evaluation is indicated, MRI with diffusion-weighted imaging could be performed, if there are no    contraindications.      No evidence of large vessel occlusion.      There is moderate stenosis of the proximal left cavernous ICA.      Aberrant right subclavian artery.      Small thyroid nodules.  "Please see discussion/guidelines.            Workstation performed: [id number]               Results from last 7 days   Lab Units 01/17/24  0247   WBC Thousand/uL 4.22*   HEMOGLOBIN g/dL 14.9   HEMATOCRIT % 46.3   PLATELETS Thousands/uL 209   NEUTROS ABS Thousands/µL 1.73*         Results from last 7 days   Lab Units 01/18/24  0518 01/17/24  0247   SODIUM mmol/L 138 137   POTASSIUM mmol/L 3.6 3.7   CHLORIDE mmol/L 106 106   CO2 mmol/L 25 22   ANION GAP mmol/L 7 9   BUN mg/dL 11 10   CREATININE mg/dL 0.95 0.96   EGFR ml/min/1.73sq m 91 89   CALCIUM mg/dL 9.2 9.4     Results from last 7 days   Lab Units 01/17/24  0247   AST U/L 28   ALT U/L 39   ALK PHOS U/L 59   TOTAL PROTEIN g/dL 8.7*   ALBUMIN g/dL 4.3   TOTAL BILIRUBIN mg/dL 0.45     Results from last 7 days   Lab Units 01/17/24  0208   POC GLUCOSE mg/dl 93     Results from last 7 days   Lab Units 01/18/24  0518 01/17/24  0247   GLUCOSE RANDOM mg/dL 92 94             No results found for: \"BETA-HYDROXYBUTYRATE\"                   Results from last 7 days   Lab Units 01/17/24  0751 01/17/24  0456 01/17/24  0247   HS TNI 0HR ng/L  --   --  4   HS TNI 2HR ng/L  --  6  --    HSTNI D2 ng/L  --  2  --    HS TNI 4HR ng/L 5  --   --    HSTNI D4 ng/L 1  --   --              Results from last 7 days   Lab Units 01/17/24  0247   TSH 3RD GENERATON uIU/mL 2.710                                                                                                           ED Treatment:   Medication Administration from 01/17/2024 0154 to 01/17/2024 1552         Date/Time Order Dose Route Action Action by Comments     01/17/2024 0329 EST iohexol (OMNIPAQUE) 350 MG/ML injection (MULTI-DOSE) 85 mL 85 mL Intravenous Given Leticia Sims --     01/17/2024 0918 EST amLODIPine (NORVASC) tablet 10 mg 10 mg Oral Given Irais Drake RN --     01/17/2024 0918 EST aspirin chewable tablet 81 mg 81 mg Oral Given Irais Drake, DARNELL --     01/17/2024 1247 EST magnesium sulfate 2 g/50 mL IVPB " (premix) 2 g 0 g Intravenous Stopped Irais Drake RN --     01/17/2024 1042 EST magnesium sulfate 2 g/50 mL IVPB (premix) 2 g 2 g Intravenous New Bag Irais Drake RN --          No past medical history on file.  Present on Admission:   Primary hypertension   Stroke-like symptoms      Admitting Diagnosis: Numbness [R20.0]  Chest pain [R07.9]  Paresthesias [R20.2]  Age/Sex: 53 y.o. male  Admission Orders:  Scheduled Medications:  amLODIPine, 10 mg, Oral, Daily  aspirin, 81 mg, Oral, Daily  atorvastatin, 20 mg, Oral, QPM      Continuous IV Infusions:     PRN Meds:  acetaminophen, 650 mg, Oral, Q4H PRN  calcium carbonate, 1,000 mg, Oral, Daily PRN  docusate sodium, 100 mg, Oral, BID PRN  ondansetron, 4 mg, Intravenous, Q6H PRN  sodium chloride (PF), 3 mL, Intravenous, Q1H PRN        IP CONSULT TO NEUROLOGY    Network Utilization Review Department  ATTENTION: Please call with any questions or concerns to [phone] and carefully listen to the prompts so that you are directed to the right person. All voicemails are confidential.   For Discharge needs, contact Care Management DC Support Team at [phone] opt. [phone]  Send all requests for admission clinical reviews, approved or denied determinations and any other requests to dedicated fax number below belonging to the campus where the patient is receiving treatment. List of dedicated fax numbers for the Facilities:  FACILITY NAME UR FAX NUMBER   ADMISSION DENIALS (Administrative/Medical Necessity) [phone]   DISCHARGE SUPPORT TEAM (NETWORK) [phone]   PARENT CHILD HEALTH (Maternity/NICU/Pediatrics) [phone]   [facility] [phone]   [facility] [phone]   [facility] [phone]   [facility] [phone]   [facility] [phone]   [facility] [phone]   [facility]  [place] [phone]   [facility] [phone]   [facility] [phone]   Critical access hospital [phone]   [facility] [phone]

## 2024-02-20 ENCOUNTER — TELEPHONE (OUTPATIENT)
Dept: NEUROLOGY | Facility: CLINIC | Age: 54
End: 2024-02-20

## 2024-02-20 NOTE — TELEPHONE ENCOUNTER
1ST ATTEMPT,     Called pt no answer, LMOM.    Thank you,     Yoli           u/ shaylee monore/ stroke like symptoms    Dc- home- 1/18/2024    Bhavin Veloz will need follow up in in 6 weeks with general attending or advance practitioner. He will not require outpatient neurological testing

## 2024-11-25 ENCOUNTER — APPOINTMENT (OUTPATIENT)
Dept: URGENT CARE | Facility: CLINIC | Age: 54
End: 2024-11-25